# Patient Record
Sex: MALE | NOT HISPANIC OR LATINO | Employment: OTHER | URBAN - METROPOLITAN AREA
[De-identification: names, ages, dates, MRNs, and addresses within clinical notes are randomized per-mention and may not be internally consistent; named-entity substitution may affect disease eponyms.]

---

## 2017-03-13 ENCOUNTER — HOSPITAL ENCOUNTER (OUTPATIENT)
Dept: LAB | Facility: MEDICAL CENTER | Age: 67
End: 2017-03-13
Attending: NEUROLOGICAL SURGERY
Payer: MEDICARE

## 2017-03-13 ENCOUNTER — HOSPITAL ENCOUNTER (OUTPATIENT)
Dept: RADIOLOGY | Facility: MEDICAL CENTER | Age: 67
End: 2017-03-13
Attending: NEUROLOGICAL SURGERY
Payer: MEDICARE

## 2017-03-13 ENCOUNTER — HOSPITAL ENCOUNTER (OUTPATIENT)
Dept: CARDIOLOGY | Facility: MEDICAL CENTER | Age: 67
End: 2017-03-13
Attending: NEUROLOGICAL SURGERY
Payer: MEDICARE

## 2017-03-13 DIAGNOSIS — Z01.812 PRE-OPERATIVE LABORATORY EXAMINATION: ICD-10-CM

## 2017-03-13 DIAGNOSIS — M48.061 SPINAL STENOSIS, LUMBAR REGION, WITHOUT NEUROGENIC CLAUDICATION: ICD-10-CM

## 2017-03-13 LAB
ANION GAP SERPL CALC-SCNC: 4 MMOL/L (ref 0–11.9)
APTT PPP: 30.6 SEC (ref 24.7–36)
BASOPHILS # BLD AUTO: 0.08 K/UL (ref 0–0.12)
BASOPHILS NFR BLD AUTO: 1 % (ref 0–1.8)
BUN SERPL-MCNC: 20 MG/DL (ref 8–22)
CALCIUM SERPL-MCNC: 9.5 MG/DL (ref 8.5–10.5)
CHLORIDE SERPL-SCNC: 105 MMOL/L (ref 96–112)
CO2 SERPL-SCNC: 28 MMOL/L (ref 20–33)
CREAT SERPL-MCNC: 1 MG/DL (ref 0.5–1.4)
EKG IMPRESSION: NORMAL
EOSINOPHIL # BLD: 0.43 K/UL (ref 0–0.51)
EOSINOPHIL NFR BLD AUTO: 5.3 % (ref 0–6.9)
ERYTHROCYTE [DISTWIDTH] IN BLOOD BY AUTOMATED COUNT: 45.1 FL (ref 35.9–50)
GLUCOSE SERPL-MCNC: 90 MG/DL (ref 65–99)
HCT VFR BLD AUTO: 52.3 % (ref 42–52)
HGB BLD-MCNC: 17.2 G/DL (ref 14–18)
IMM GRANULOCYTES # BLD AUTO: 0.04 K/UL (ref 0–0.11)
IMM GRANULOCYTES NFR BLD AUTO: 0.5 % (ref 0–0.9)
INR PPP: 0.96 (ref 0.87–1.13)
LYMPHOCYTES # BLD: 1.5 K/UL (ref 1–4.8)
LYMPHOCYTES NFR BLD AUTO: 18.5 % (ref 22–41)
MCH RBC QN AUTO: 30.7 PG (ref 27–33)
MCHC RBC AUTO-ENTMCNC: 32.9 G/DL (ref 33.7–35.3)
MCV RBC AUTO: 93.2 FL (ref 81.4–97.8)
MONOCYTES # BLD: 0.71 K/UL (ref 0–0.85)
MONOCYTES NFR BLD AUTO: 8.7 % (ref 0–13.4)
NEUTROPHILS # BLD: 5.36 K/UL (ref 1.82–7.42)
NEUTROPHILS NFR BLD AUTO: 66 % (ref 44–72)
NRBC # BLD AUTO: 0 K/UL
NRBC BLD-RTO: 0 /100 WBC
PLATELET # BLD AUTO: 224 K/UL (ref 164–446)
PMV BLD AUTO: 11 FL (ref 9–12.9)
POTASSIUM SERPL-SCNC: 4.4 MMOL/L (ref 3.6–5.5)
PROTHROMBIN TIME: 13.1 SEC (ref 12–14.6)
RBC # BLD AUTO: 5.61 M/UL (ref 4.7–6.1)
SODIUM SERPL-SCNC: 137 MMOL/L (ref 135–145)
WBC # BLD AUTO: 8.1 K/UL (ref 4.8–10.8)

## 2017-03-13 PROCEDURE — 71020 DX-CHEST-2 VIEWS: CPT

## 2017-03-13 PROCEDURE — 93005 ELECTROCARDIOGRAM TRACING: CPT | Performed by: NEUROLOGICAL SURGERY

## 2017-03-13 PROCEDURE — 93010 ELECTROCARDIOGRAM REPORT: CPT | Performed by: INTERNAL MEDICINE

## 2017-03-13 PROCEDURE — 72148 MRI LUMBAR SPINE W/O DYE: CPT

## 2017-04-07 ENCOUNTER — HOSPITAL ENCOUNTER (INPATIENT)
Facility: MEDICAL CENTER | Age: 67
LOS: 3 days | DRG: 460 | End: 2017-04-10
Attending: NEUROLOGICAL SURGERY | Admitting: NEUROLOGICAL SURGERY
Payer: MEDICARE

## 2017-04-07 ENCOUNTER — APPOINTMENT (OUTPATIENT)
Dept: RADIOLOGY | Facility: MEDICAL CENTER | Age: 67
DRG: 460 | End: 2017-04-07
Attending: NEUROLOGICAL SURGERY
Payer: MEDICARE

## 2017-04-07 DIAGNOSIS — M48.061 SPINAL STENOSIS, LUMBAR REGION, WITHOUT NEUROGENIC CLAUDICATION: ICD-10-CM

## 2017-04-07 PROCEDURE — 4A11X4G MONITORING OF PERIPHERAL NERVOUS ELECTRICAL ACTIVITY, INTRAOPERATIVE, EXTERNAL APPROACH: ICD-10-PCS | Performed by: NEUROLOGICAL SURGERY

## 2017-04-07 PROCEDURE — 160048 HCHG OR STATISTICAL LEVEL 1-5: Performed by: NEUROLOGICAL SURGERY

## 2017-04-07 PROCEDURE — 700101 HCHG RX REV CODE 250

## 2017-04-07 PROCEDURE — 700101 HCHG RX REV CODE 250: Performed by: PHYSICIAN ASSISTANT

## 2017-04-07 PROCEDURE — 700111 HCHG RX REV CODE 636 W/ 250 OVERRIDE (IP)

## 2017-04-07 PROCEDURE — 502626 HCHG SURGIFLO HEMOSTATIC MATRIX 6ML: Performed by: NEUROLOGICAL SURGERY

## 2017-04-07 PROCEDURE — 160036 HCHG PACU - EA ADDL 30 MINS PHASE I: Performed by: NEUROLOGICAL SURGERY

## 2017-04-07 PROCEDURE — 500885 HCHG PACK, JACKSON TABLE: Performed by: NEUROLOGICAL SURGERY

## 2017-04-07 PROCEDURE — 500114 HCHG BONE, CHIPS CANCELLOUS 30CC: Performed by: NEUROLOGICAL SURGERY

## 2017-04-07 PROCEDURE — A4606 OXYGEN PROBE USED W OXIMETER: HCPCS | Performed by: NEUROLOGICAL SURGERY

## 2017-04-07 PROCEDURE — 90471 IMMUNIZATION ADMIN: CPT

## 2017-04-07 PROCEDURE — 500819 HCHG MARKERS, PASSIVE REFLECTIVE: Performed by: NEUROLOGICAL SURGERY

## 2017-04-07 PROCEDURE — 502000 HCHG MISC OR IMPLANTS RC 0278: Performed by: NEUROLOGICAL SURGERY

## 2017-04-07 PROCEDURE — 160042 HCHG SURGERY MINUTES - EA ADDL 1 MIN LEVEL 5: Performed by: NEUROLOGICAL SURGERY

## 2017-04-07 PROCEDURE — 700112 HCHG RX REV CODE 229: Performed by: PHYSICIAN ASSISTANT

## 2017-04-07 PROCEDURE — 700102 HCHG RX REV CODE 250 W/ 637 OVERRIDE(OP): Performed by: PHYSICIAN ASSISTANT

## 2017-04-07 PROCEDURE — 770001 HCHG ROOM/CARE - MED/SURG/GYN PRIV*

## 2017-04-07 PROCEDURE — 0SG30AJ FUSION OF LUMBOSACRAL JOINT WITH INTERBODY FUSION DEVICE, POSTERIOR APPROACH, ANTERIOR COLUMN, OPEN APPROACH: ICD-10-PCS | Performed by: NEUROLOGICAL SURGERY

## 2017-04-07 PROCEDURE — A9270 NON-COVERED ITEM OR SERVICE: HCPCS

## 2017-04-07 PROCEDURE — 160035 HCHG PACU - 1ST 60 MINS PHASE I: Performed by: NEUROLOGICAL SURGERY

## 2017-04-07 PROCEDURE — A4314 CATH W/DRAINAGE 2-WAY LATEX: HCPCS | Performed by: NEUROLOGICAL SURGERY

## 2017-04-07 PROCEDURE — 500331 HCHG COTTONOID, SURG PATTIE: Performed by: NEUROLOGICAL SURGERY

## 2017-04-07 PROCEDURE — 501838 HCHG SUTURE GENERAL: Performed by: NEUROLOGICAL SURGERY

## 2017-04-07 PROCEDURE — 0SG10AJ FUSION OF 2 OR MORE LUMBAR VERTEBRAL JOINTS WITH INTERBODY FUSION DEVICE, POSTERIOR APPROACH, ANTERIOR COLUMN, OPEN APPROACH: ICD-10-PCS | Performed by: NEUROLOGICAL SURGERY

## 2017-04-07 PROCEDURE — 110382 HCHG SHELL REV 271: Performed by: NEUROLOGICAL SURGERY

## 2017-04-07 PROCEDURE — 700111 HCHG RX REV CODE 636 W/ 250 OVERRIDE (IP): Performed by: NEUROLOGICAL SURGERY

## 2017-04-07 PROCEDURE — 700101 HCHG RX REV CODE 250: Performed by: NEUROLOGICAL SURGERY

## 2017-04-07 PROCEDURE — 502240 HCHG MISC OR SUPPLY RC 0272: Performed by: NEUROLOGICAL SURGERY

## 2017-04-07 PROCEDURE — 90686 IIV4 VACC NO PRSV 0.5 ML IM: CPT | Performed by: NEUROLOGICAL SURGERY

## 2017-04-07 PROCEDURE — 500864 HCHG NEEDLE, SPINAL 18G: Performed by: NEUROLOGICAL SURGERY

## 2017-04-07 PROCEDURE — 700102 HCHG RX REV CODE 250 W/ 637 OVERRIDE(OP)

## 2017-04-07 PROCEDURE — 0SG10A1 FUSION OF 2 OR MORE LUMBAR VERTEBRAL JOINTS WITH INTERBODY FUSION DEVICE, POSTERIOR APPROACH, POSTERIOR COLUMN, OPEN APPROACH: ICD-10-PCS | Performed by: NEUROLOGICAL SURGERY

## 2017-04-07 PROCEDURE — A9270 NON-COVERED ITEM OR SERVICE: HCPCS | Performed by: PHYSICIAN ASSISTANT

## 2017-04-07 PROCEDURE — 500389 HCHG DRAIN, RESERVOIR SUCT JP 100CC: Performed by: NEUROLOGICAL SURGERY

## 2017-04-07 PROCEDURE — 160009 HCHG ANES TIME/MIN: Performed by: NEUROLOGICAL SURGERY

## 2017-04-07 PROCEDURE — 160002 HCHG RECOVERY MINUTES (STAT): Performed by: NEUROLOGICAL SURGERY

## 2017-04-07 PROCEDURE — A6404 STERILE GAUZE > 48 SQ IN: HCPCS | Performed by: NEUROLOGICAL SURGERY

## 2017-04-07 PROCEDURE — 110371 HCHG SHELL REV 272: Performed by: NEUROLOGICAL SURGERY

## 2017-04-07 PROCEDURE — 72100 X-RAY EXAM L-S SPINE 2/3 VWS: CPT

## 2017-04-07 PROCEDURE — 500375 HCHG DRAIN, J-P ROUND 10FR: Performed by: NEUROLOGICAL SURGERY

## 2017-04-07 PROCEDURE — 160031 HCHG SURGERY MINUTES - 1ST 30 MINS LEVEL 5: Performed by: NEUROLOGICAL SURGERY

## 2017-04-07 DEVICE — BONE CHIPS CANC 4-10MM 30CC - CRUSHED  FREEZE DRIED ONLY: Type: IMPLANTABLE DEVICE | Status: FUNCTIONAL

## 2017-04-07 RX ORDER — AMOXICILLIN 250 MG
1 CAPSULE ORAL NIGHTLY
Status: DISCONTINUED | OUTPATIENT
Start: 2017-04-07 | End: 2017-04-09

## 2017-04-07 RX ORDER — ACETAMINOPHEN 500 MG
TABLET ORAL
Status: DISPENSED
Start: 2017-04-07 | End: 2017-04-07

## 2017-04-07 RX ORDER — CALCIUM CARBONATE 500 MG/1
500 TABLET, CHEWABLE ORAL 2 TIMES DAILY
Status: DISCONTINUED | OUTPATIENT
Start: 2017-04-07 | End: 2017-04-10 | Stop reason: HOSPADM

## 2017-04-07 RX ORDER — ACETAMINOPHEN 500 MG
1000 TABLET ORAL EVERY 6 HOURS PRN
Status: DISCONTINUED | OUTPATIENT
Start: 2017-04-07 | End: 2017-04-09

## 2017-04-07 RX ORDER — ONDANSETRON 2 MG/ML
4 INJECTION INTRAMUSCULAR; INTRAVENOUS EVERY 4 HOURS PRN
Status: DISCONTINUED | OUTPATIENT
Start: 2017-04-07 | End: 2017-04-10 | Stop reason: HOSPADM

## 2017-04-07 RX ORDER — DIAZEPAM 5 MG/ML
5 INJECTION, SOLUTION INTRAMUSCULAR; INTRAVENOUS EVERY 6 HOURS PRN
Status: DISCONTINUED | OUTPATIENT
Start: 2017-04-07 | End: 2017-04-08

## 2017-04-07 RX ORDER — HYDRALAZINE HYDROCHLORIDE 20 MG/ML
10 INJECTION INTRAMUSCULAR; INTRAVENOUS
Status: DISCONTINUED | OUTPATIENT
Start: 2017-04-07 | End: 2017-04-10 | Stop reason: HOSPADM

## 2017-04-07 RX ORDER — HEPARIN SODIUM 5000 [USP'U]/ML
5000 INJECTION, SOLUTION INTRAVENOUS; SUBCUTANEOUS EVERY 12 HOURS
Status: DISCONTINUED | OUTPATIENT
Start: 2017-04-08 | End: 2017-04-10 | Stop reason: HOSPADM

## 2017-04-07 RX ORDER — SODIUM CHLORIDE, SODIUM LACTATE, POTASSIUM CHLORIDE, CALCIUM CHLORIDE 600; 310; 30; 20 MG/100ML; MG/100ML; MG/100ML; MG/100ML
1000 INJECTION, SOLUTION INTRAVENOUS
Status: COMPLETED | OUTPATIENT
Start: 2017-04-07 | End: 2017-04-07

## 2017-04-07 RX ORDER — AMOXICILLIN 250 MG
1 CAPSULE ORAL
Status: DISCONTINUED | OUTPATIENT
Start: 2017-04-07 | End: 2017-04-10 | Stop reason: HOSPADM

## 2017-04-07 RX ORDER — ENEMA 19; 7 G/133ML; G/133ML
1 ENEMA RECTAL
Status: DISCONTINUED | OUTPATIENT
Start: 2017-04-07 | End: 2017-04-10 | Stop reason: HOSPADM

## 2017-04-07 RX ORDER — DIAZEPAM 5 MG/ML
INJECTION, SOLUTION INTRAMUSCULAR; INTRAVENOUS
Status: COMPLETED
Start: 2017-04-07 | End: 2017-04-07

## 2017-04-07 RX ORDER — VANCOMYCIN HCL 900 MCG/MG
POWDER (GRAM) MISCELLANEOUS
Status: DISCONTINUED | OUTPATIENT
Start: 2017-04-07 | End: 2017-04-07 | Stop reason: HOSPADM

## 2017-04-07 RX ORDER — DOCUSATE SODIUM 100 MG/1
100 CAPSULE, LIQUID FILLED ORAL 2 TIMES DAILY
Status: DISCONTINUED | OUTPATIENT
Start: 2017-04-07 | End: 2017-04-10 | Stop reason: HOSPADM

## 2017-04-07 RX ORDER — OXYCODONE HCL 20 MG/1
TABLET, FILM COATED, EXTENDED RELEASE ORAL
Status: DISPENSED
Start: 2017-04-07 | End: 2017-04-07

## 2017-04-07 RX ORDER — DIPHENHYDRAMINE HCL 25 MG
25 TABLET ORAL EVERY 6 HOURS PRN
Status: DISCONTINUED | OUTPATIENT
Start: 2017-04-07 | End: 2017-04-10 | Stop reason: HOSPADM

## 2017-04-07 RX ORDER — POLYETHYLENE GLYCOL 3350 17 G/17G
1 POWDER, FOR SOLUTION ORAL 2 TIMES DAILY PRN
Status: DISCONTINUED | OUTPATIENT
Start: 2017-04-07 | End: 2017-04-10 | Stop reason: HOSPADM

## 2017-04-07 RX ORDER — BUPIVACAINE HYDROCHLORIDE AND EPINEPHRINE 5; 5 MG/ML; UG/ML
INJECTION, SOLUTION EPIDURAL; INTRACAUDAL; PERINEURAL
Status: DISCONTINUED | OUTPATIENT
Start: 2017-04-07 | End: 2017-04-07 | Stop reason: HOSPADM

## 2017-04-07 RX ORDER — ALPRAZOLAM 0.25 MG/1
0.25 TABLET ORAL 2 TIMES DAILY PRN
Status: DISCONTINUED | OUTPATIENT
Start: 2017-04-07 | End: 2017-04-10 | Stop reason: HOSPADM

## 2017-04-07 RX ORDER — MORPHINE SULFATE 15 MG/1
15 TABLET, FILM COATED, EXTENDED RELEASE ORAL
Status: DISCONTINUED | OUTPATIENT
Start: 2017-04-07 | End: 2017-04-10 | Stop reason: HOSPADM

## 2017-04-07 RX ORDER — DIAZEPAM 2 MG/1
5 TABLET ORAL EVERY 8 HOURS PRN
Status: DISCONTINUED | OUTPATIENT
Start: 2017-04-07 | End: 2017-04-08

## 2017-04-07 RX ORDER — BISACODYL 10 MG
10 SUPPOSITORY, RECTAL RECTAL
Status: DISCONTINUED | OUTPATIENT
Start: 2017-04-07 | End: 2017-04-10 | Stop reason: HOSPADM

## 2017-04-07 RX ORDER — ONDANSETRON 4 MG/1
4 TABLET, ORALLY DISINTEGRATING ORAL EVERY 4 HOURS PRN
Status: DISCONTINUED | OUTPATIENT
Start: 2017-04-07 | End: 2017-04-10 | Stop reason: HOSPADM

## 2017-04-07 RX ORDER — LIDOCAINE HYDROCHLORIDE 10 MG/ML
INJECTION, SOLUTION INFILTRATION; PERINEURAL
Status: COMPLETED
Start: 2017-04-07 | End: 2017-04-07

## 2017-04-07 RX ORDER — DIPHENHYDRAMINE HYDROCHLORIDE 50 MG/ML
25 INJECTION INTRAMUSCULAR; INTRAVENOUS EVERY 6 HOURS PRN
Status: DISCONTINUED | OUTPATIENT
Start: 2017-04-07 | End: 2017-04-10 | Stop reason: HOSPADM

## 2017-04-07 RX ORDER — GABAPENTIN 300 MG/1
CAPSULE ORAL
Status: DISPENSED
Start: 2017-04-07 | End: 2017-04-07

## 2017-04-07 RX ORDER — CLINDAMYCIN PHOSPHATE 900 MG/50ML
900 INJECTION, SOLUTION INTRAVENOUS
Status: DISPENSED | OUTPATIENT
Start: 2017-04-07 | End: 2017-04-08

## 2017-04-07 RX ORDER — SODIUM CHLORIDE AND POTASSIUM CHLORIDE 150; 900 MG/100ML; MG/100ML
INJECTION, SOLUTION INTRAVENOUS CONTINUOUS
Status: DISCONTINUED | OUTPATIENT
Start: 2017-04-07 | End: 2017-04-09

## 2017-04-07 RX ORDER — BACITRACIN 50000 [IU]/1
INJECTION, POWDER, FOR SOLUTION INTRAMUSCULAR
Status: DISCONTINUED | OUTPATIENT
Start: 2017-04-07 | End: 2017-04-07 | Stop reason: HOSPADM

## 2017-04-07 RX ADMIN — MORPHINE SULFATE 15 MG: 15 TABLET, EXTENDED RELEASE ORAL at 20:41

## 2017-04-07 RX ADMIN — POTASSIUM CHLORIDE AND SODIUM CHLORIDE: 900; 150 INJECTION, SOLUTION INTRAVENOUS at 18:45

## 2017-04-07 RX ADMIN — FENTANYL CITRATE 50 MCG: 50 INJECTION, SOLUTION INTRAMUSCULAR; INTRAVENOUS at 16:47

## 2017-04-07 RX ADMIN — INFLUENZA A VIRUS A/CALIFORNIA/7/2009 X-179A (H1N1) ANTIGEN (FORMALDEHYDE INACTIVATED), INFLUENZA A VIRUS A/HONG KONG/4801/2014 X-263B (H3N2) ANTIGEN (FORMALDEHYDE INACTIVATED), INFLUENZA B VIRUS B/PHUKET/3073/2013 ANTIGEN (FORMALDEHYDE INACTIVATED), AND INFLUENZA B VIRUS B/BRISBANE/60/2008 ANTIGEN (FORMALDEHYDE INACTIVATED) 0.5 ML: 15; 15; 15; 15 INJECTION, SUSPENSION INTRAMUSCULAR at 20:42

## 2017-04-07 RX ADMIN — VITAMIN D, TAB 1000IU (100/BT) 5000 UNITS: 25 TAB at 18:41

## 2017-04-07 RX ADMIN — FENTANYL CITRATE 50 MCG: 50 INJECTION, SOLUTION INTRAMUSCULAR; INTRAVENOUS at 16:41

## 2017-04-07 RX ADMIN — DIPHENHYDRAMINE HCL 25 MG: 25 TABLET ORAL at 23:45

## 2017-04-07 RX ADMIN — ANTACID TABLETS 500 MG: 500 TABLET, CHEWABLE ORAL at 21:00

## 2017-04-07 RX ADMIN — STANDARDIZED SENNA CONCENTRATE AND DOCUSATE SODIUM 1 TABLET: 8.6; 5 TABLET, FILM COATED ORAL at 20:41

## 2017-04-07 RX ADMIN — MORPHINE SULFATE: 50 INJECTION, SOLUTION, CONCENTRATE INTRAVENOUS at 16:54

## 2017-04-07 RX ADMIN — DIAZEPAM 5 MG: 2 TABLET ORAL at 18:42

## 2017-04-07 RX ADMIN — SODIUM CHLORIDE, SODIUM LACTATE, POTASSIUM CHLORIDE, CALCIUM CHLORIDE 1000 ML: 600; 310; 30; 20 INJECTION, SOLUTION INTRAVENOUS at 09:49

## 2017-04-07 RX ADMIN — DOCUSATE SODIUM 100 MG: 100 CAPSULE ORAL at 20:41

## 2017-04-07 RX ADMIN — LIDOCAINE HYDROCHLORIDE: 10 INJECTION, SOLUTION INFILTRATION; PERINEURAL at 09:49

## 2017-04-07 ASSESSMENT — COPD QUESTIONNAIRES
HAVE YOU SMOKED AT LEAST 100 CIGARETTES IN YOUR ENTIRE LIFE: NO/DON'T KNOW
DO YOU EVER COUGH UP ANY MUCUS OR PHLEGM?: NO/ONLY WITH OCCASIONAL COLDS OR INFECTIONS
COPD SCREENING SCORE: 2
DURING THE PAST 4 WEEKS HOW MUCH DID YOU FEEL SHORT OF BREATH: NONE/LITTLE OF THE TIME

## 2017-04-07 ASSESSMENT — LIFESTYLE VARIABLES
EVER_SMOKED: NEVER
DO YOU DRINK ALCOHOL: NO

## 2017-04-07 ASSESSMENT — PAIN SCALES - GENERAL
PAINLEVEL_OUTOF10: 0
PAINLEVEL_OUTOF10: 4
PAINLEVEL_OUTOF10: 5
PAINLEVEL_OUTOF10: 4
PAINLEVEL_OUTOF10: 0
PAINLEVEL_OUTOF10: 2

## 2017-04-07 NOTE — IP AVS SNAPSHOT
" <p align=\"LEFT\"><IMG SRC=\"//[email or web address]$/[email or web address]\" alt=\"Image\" WIDTH=\"50%\" HEIGHT=\"200\" BORDER=\"\"></p>                   Name:Macario Ocampo  Medical Record Number:[id number]  CSN: [id number]    YOB: 1950   Age: 66 y.o.  Sex: male  HT:1.689 m (5' 6.5\") WT: 62.3 kg (137 lb 5.6 oz)          Admit Date: 4/7/2017     Discharge Date:   Today's Date: 4/10/2017  Attending Doctor:  Ken Booth M.D.                  Allergies:  Pcn; Prednisone; and Shellfish allergy          Follow-up Information     1. Follow up with [facility]. Go on 4/17/2017.    Why:  at 11:00 am       [facility]  [id number]  [place]    Contact information    [place] [id number] [phone]           Medication List      Take these Medications        Instructions    calcium carbonate 500 MG Chew   Commonly known as:  TUMS    Take 1 Tab by mouth 2 Times a Day.   Dose:  500 mg       Cholecalciferol 5000 UNITS Tabs    Take 1,000 Units by mouth every day.   Dose:  1000 Units       CYMBALTA 60 MG Cpep delayed-release capsule   Generic drug:  duloxetine    Take 60 mg by mouth 2 Times a Day.   Dose:  60 mg       gabapentin 300 MG Caps   Commonly known as:  NEURONTIN    Take 300 mg by mouth 2 Times a Day.   Dose:  300 mg       hydrocodone/acetaminophen  MG Tabs   What changed:    - how much to take  - when to take this  - reasons to take this   Commonly known as:  NORCO    Take 1-3 Tabs by mouth every four hours as needed for Moderate Pain or Severe Pain.   Dose:  1-3 Tab       lorazepam 1 MG Tabs   Commonly known as:  ATIVAN    Take 0.5-1 mg by mouth 1 time daily as needed. Indications: Feeling Anxious   Dose:  0.5-1 mg       methocarbamol 750 MG Tabs   Commonly known as:  ROBAXIN    Take 1 Tab by mouth every 8 hours as needed (muscle spasm).   Dose:  750 mg       morphine ER 15 MG Tbcr tablet   Commonly known as:  MS CONTIN    Take 1 Tab by mouth every bedtime.   Dose:  15 mg       propranolol 40 " MG Tabs   Commonly known as:  INDERAL    take 1 tablet by mouth twice daily       senna-docusate 8.6-50 MG Tabs   Commonly known as:  PERICOLACE or SENOKOT S    Take 2 Tabs by mouth 2 times a day.   Dose:  2 Tab

## 2017-04-07 NOTE — IP AVS SNAPSHOT
4/10/2017          Macario Ocampo  [address] [facility] [place] [address]    Dear Macario:    [facility] wants to ensure your discharge home is safe and you or your loved ones have had all your questions answered regarding your care after you leave the hospital.    You may receive a telephone call within two days of your discharge.  This call is to make certain you understand your discharge instructions as well as ensure we provided you with the best care possible during your stay with us.     The call will only last approximately 3-5 minutes and will be done by a nurse.    Once again, we want to ensure your discharge home is safe and that you have a clear understanding of any next steps in your care.  If you have any questions or concerns, please do not hesitate to contact us, we are here for you.  Thank you for choosing [facility] for your healthcare needs.    Sincerely,    Rashad Aj    [facility]

## 2017-04-07 NOTE — IP AVS SNAPSHOT
" Home Care Instructions                                                                                                                  Name:Macario Ocampo  Medical Record Number:[id number]  CSN: [id number]    YOB: 1950   Age: 66 y.o.  Sex: male  HT:1.689 m (5' 6.5\") WT: 62.3 kg (137 lb 5.6 oz)          Admit Date: 4/7/2017     Discharge Date:   Today's Date: 4/10/2017  Attending Doctor:  Ken Booth M.D.                  Allergies:  Pcn; Prednisone; and Shellfish allergy            Discharge Instructions       Continue to wear brace as directed, may remove for showering.  Must have Brace on when out of bed for more than 5 minutes.  No bending, lifting or twisting.   Daily OTC laxative while on narcotics.   No aspirin, NSAID or blood thinners x 2 weeks.   Ambulate often to prevent DVT   Continue incentive spirometer hourly   F/u with [facility] on April 17 at 11:00    Home Health Referral  - will receive call tomorrow morning from Home Health provider    Lumbar Laminectomy, Care After  Refer to this sheet in the next few weeks. These instructions provide you with information on caring for yourself after your procedure. Your health care provider may also give you more specific instructions. Your treatment has been planned according to current medical practices, but problems sometimes occur. Call your health care provider if you have any problems or questions after your procedure.  HOME CARE INSTRUCTIONS   · Check the incision twice a day for signs of infection. Some signs may include a foul-smelling, greenish or yellowish discharge from the wound, increased pain, or increased redness over the incision.  · Change your bandages about 24-36 hours after surgery, or as directed.  · You may shower once the bandage is removed, or as directed. Avoid tub baths, swimming, and hot tubs for 3 weeks or until your incision has healed completely. If you have stitches or staples, they may be removed 2-3 " weeks after surgery, or as directed by your doctor.  · Daily exercise is helpful to prevent the return of problems. Walking is permitted. You may use a treadmill without an incline. Cut down on activities and exercise if you have discomfort. You may also go up and down stairs as much as you can tolerate.  · Do not lift anything heavier than 15 lb. Avoid bending or twisting at the waist. Always bend your knees.  · Maintain strength and range of motion as instructed.  · Do not drive for 2-3 weeks, or as directed by your doctor. You may be a passenger for 20-30 minutes at a time. Lying back in the passenger seat may be more comfortable for you.  · Limit your sitting to intervals of 20-30 minutes. You should lie down or walk in between sitting periods. There are no limitations for sitting in a recliner chair.  · Only take over-the-counter or prescription medicines for pain, discomfort, or fever as directed by your health care provider.  SEEK MEDICAL CARE IF:   · There is increased bleeding (more than a small spot) from the wound.  · You notice redness, swelling, or increasing pain in the wound.  · Pus is coming from the wound.  · You have a fever for more than 2-3 days.  · You notice a foul smell coming from the wound or dressing.  · You have increasing pain in your wound.  SEEK IMMEDIATE MEDICAL CARE IF:   · You develop a rash.  · You have difficulty breathing.  · You have any allergic problems.  · You develop a headache or stiff neck that does not respond to pain relievers.  · You are unable to urinate.  · You develop new onset of pain, numbness, or weakness in the buttocks or lower extremities.  MAKE SURE YOU:   · Understand these instructions.  · Will watch your condition.  · Will get help right away if you are not doing well or get worse.     This information is not intended to replace advice given to you by your health care provider. Make sure you discuss any questions you have with your health care provider.        Document Released: 11/21/2005 Document Revised: 08/20/2014 Document Reviewed: 05/15/2014  [employer] Interactive Patient Education ©2016 [employer] Inc.      Incision Care  An incision is when a surgeon cuts into your body. After surgery, the incision needs to be cared for properly to prevent infection.   HOW TO CARE FOR YOUR INCISION  · Take medicines only as directed by your health care provider.  · There are many different ways to close and cover an incision, including stitches, skin glue, and adhesive strips. Follow your health care provider's instructions on:  · Incision care.  · Bandage (dressing) changes and removal.  · Incision closure removal.  · Do not take baths, swim, or use a hot tub until your health care provider approves. You may shower as directed by your health care provider.  · Resume your normal diet and activities as directed.  · Use anti-itch medicine (such as an antihistamine) as directed by your health care provider. The incision may itch while it is healing. Do not pick or scratch at the incision.  · Drink enough fluid to keep your urine clear or pale yellow.  SEEK MEDICAL CARE IF:   · You have drainage, redness, swelling, or pain at your incision site.  · You have muscle aches, chills, or a general ill feeling.  · You notice a bad smell coming from the incision or dressing.  · Your incision edges separate after the sutures, staples, or skin adhesive strips have been removed.  · You have persistent nausea or vomiting.  · You have a fever.  · You are dizzy.  SEEK IMMEDIATE MEDICAL CARE IF:   · You have a rash.  · You faint.  · You have difficulty breathing.  MAKE SURE YOU:   · Understand these instructions.  · Will watch your condition.  · Will get help right away if you are not doing well or get worse.     This information is not intended to replace advice given to you by your health care provider. Make sure you discuss any questions you have with your health care provider.     Document  Released: 07/07/2006 Document Revised: 01/08/2016 Document Reviewed: 02/11/2015  [employer] Interactive Patient Education ©2016 [employer]      Discharge Instructions    Discharged to home by car with friend. Discharged via walking, hospital escort: Yes.  Special equipment needed: TLSO    Be sure to schedule a follow-up appointment with your primary care doctor or any specialists as instructed.     Discharge Plan:   Influenza Vaccine Given - only chart on this line when given: Influenza Vaccine Given (See MAR)    I understand that a diet low in cholesterol, fat, and sodium is recommended for good health. Unless I have been given specific instructions below for another diet, I accept this instruction as my diet prescription.       Special Instructions: None    · Is patient discharged on Warfarin / Coumadin?   No     · Is patient Post Blood Transfusion?  No    Depression / Suicide Risk    As you are discharged from this [facility] facility, it is important to learn how to keep safe from harming yourself.    Recognize the warning signs:  · Abrupt changes in personality, positive or negative- including increase in energy   · Giving away possessions  · Change in eating patterns- significant weight changes-  positive or negative  · Change in sleeping patterns- unable to sleep or sleeping all the time   · Unwillingness or inability to communicate  · Depression  · Unusual sadness, discouragement and loneliness  · Talk of wanting to die  · Neglect of personal appearance   · Rebelliousness- reckless behavior  · Withdrawal from people/activities they love  · Confusion- inability to concentrate     If you or a loved one observes any of these behaviors or has concerns about self-harm, here's what you can do:  · Talk about it- your feelings and reasons for harming yourself  · Remove any means that you might use to hurt yourself (examples: pills, rope, extension cords, firearm)  · Get professional help from the community  (Mental Health, Substance Abuse, psychological counseling)  · Do not be alone:Call your Safe Contact- someone whom you trust who will be there for you.  · Call your local CRISIS HOTLINE 307-3353 or 938-071-3299  · Call your local Children's Mobile Crisis Response Team [place] (568) 534-6253 or www.[employer]  · Call the toll free National Suicide Prevention Hotlines   · National Suicide Prevention Lifeline 997-341-JWEG (9016)  · [employer] [facility] Line Network 800-SUICIDE (866-1469)            Follow-up Information     1. Follow up with [facility]. Go on 4/17/2017.    Why:  at 11:00 am       [facility]  [address]  [place]    Contact information    [place] [id number]  [phone]           Discharge Medication Instructions:    Below are the medications your physician expects you to take upon discharge:    Review all your home medications and newly ordered medications with your doctor and/or pharmacist. Follow medication instructions as directed by your doctor and/or pharmacist.    Please keep your medication list with you and share with your physician.               Medication List      START taking these medications        Instructions    calcium carbonate 500 MG Chew   Last time this was given:  500 mg on 4/10/2017  9:24 AM   Commonly known as:  TUMS    Take 1 Tab by mouth 2 Times a Day.   Dose:  500 mg       Cholecalciferol 5000 UNITS Tabs   Last time this was given:  5,000 Units on 4/10/2017  9:25 AM    Take 1,000 Units by mouth every day.   Dose:  1000 Units       methocarbamol 750 MG Tabs   Last time this was given:  750 mg on 4/10/2017  4:54 AM   Commonly known as:  ROBAXIN    Take 1 Tab by mouth every 8 hours as needed (muscle spasm).   Dose:  750 mg       morphine ER 15 MG Tbcr tablet   Last time this was given:  15 mg on 4/9/2017  8:19 PM   Commonly known as:  MS CONTIN    Take 1 Tab by mouth every bedtime.   Dose:  15 mg       senna-docusate 8.6-50 MG Tabs   Last  time this was given:  2 Tabs on 4/9/2017  8:20 PM   Commonly known as:  PERICOLACE or SENOKOT S    Take 2 Tabs by mouth 2 times a day.   Dose:  2 Tab         CHANGE how you take these medications        Instructions    hydrocodone/acetaminophen  MG Tabs   What changed:    - how much to take  - when to take this  - reasons to take this   Last time this was given:  2 Tabs on 4/10/2017  9:37 AM   Commonly known as:  NORCO    Take 1-3 Tabs by mouth every four hours as needed for Moderate Pain or Severe Pain.   Dose:  1-3 Tab         CONTINUE taking these medications        Instructions    CYMBALTA 60 MG Cpep delayed-release capsule   Generic drug:  duloxetine    Take 60 mg by mouth 2 Times a Day.   Dose:  60 mg       gabapentin 300 MG Caps   Commonly known as:  NEURONTIN    Take 300 mg by mouth 2 Times a Day.   Dose:  300 mg       lorazepam 1 MG Tabs   Commonly known as:  ATIVAN    Take 0.5-1 mg by mouth 1 time daily as needed. Indications: Feeling Anxious   Dose:  0.5-1 mg       propranolol 40 MG Tabs   Last time this was given:  40 mg on 4/10/2017  9:25 AM   Commonly known as:  INDERAL    take 1 tablet by mouth twice daily         STOP taking these medications     celecoxib 200 MG Caps   Commonly known as:  CELEBREX       LUNESTA 3 MG Tabs   Generic drug:  Eszopiclone               Instructions           Diet / Nutrition:    Follow any diet instructions given to you by your doctor or the dietician, including how much salt (sodium) you are allowed each day.    If you are overweight, talk to your doctor about a weight reduction plan.    Activity:    Remain physically active following your doctor's instructions about exercise and activity.    Rest often.     Any time you become even a little tired or short of breath, SIT DOWN and rest.    Worsening Symptoms:    Report any of the following signs and symptoms to the doctor's office immediately:    *Pain of jaw, arm, or neck  *Chest pain not relieved by medication                                *Dizziness or loss of consciousness  *Difficulty breathing even when at rest   *More tired than usual                                       *Bleeding drainage or swelling of surgical site  *Swelling of feet, ankles, legs or stomach                 *Fever (>100ºF)  *Pink or blood tinged sputum  *Weight gain (3lbs/day or 5lbs /week)           *Shock from internal defibrillator (if applicable)  *Palpitations or irregular heartbeats                *Cool and/or numb extremities    Stroke Awareness    Common Risk Factors for Stroke include:    Age  Atrial Fibrillation  Carotid Artery Stenosis  Diabetes Mellitus  Excessive alcohol consumption  High blood pressure  Overweight   Physical inactivity  Smoking    Warning signs and symptoms of a stroke include:    *Sudden numbness or weakness of the face, arm or leg (especially on one side of the body).  *Sudden confusion, trouble speaking or understanding.  *Sudden trouble seeing in one or both eyes.  *Sudden trouble walking, dizziness, loss of balance or coordination.Sudden severe headache with no known cause.    It is very important to get treatment quickly when a stroke occurs. If you experience any of the above warning signs, call 911 immediately.                   Disclaimer         Quit Smoking / Tobacco Use:    I understand the use of any tobacco products increases my chance of suffering from future heart disease or stroke and could cause other illnesses which may shorten my life. Quitting the use of tobacco products is the single most important thing I can do to improve my health. For further information on smoking / tobacco cessation call a Toll Free Quit Line at 1-202.121.2196 (*National Cancer [place]) or 1-793.838.2561 (American Lung Association) or you can access the web based program at www.lungusa.org.    [place] Tobacco Users Help Line:  (512) 464-5538       Toll Free: 1-690.590.6232  Quit Tobacco Program [facility]  Services (349)188-9467    Crisis Hotline:    National Crisis Hotline:  3-865-TBAYACW or 1-437.757.5657    [place] Crisis Hotline:    1-127.447.9407 or 953-583-5476    Discharge Survey:   Thank you for choosing [facility]. We hope we did everything we could to make your hospital stay a pleasant one. You may be receiving a phone survey and we would appreciate your time and participation in answering the questions. Your input is very valuable to us in our efforts to improve our service to our patients and their families.        My signature on this form indicates that:    1. I have reviewed and understand the above information.  2. My questions regarding this information have been answered to my satisfaction.  3. I have formulated a plan with my discharge nurse to obtain my prescribed medications for home.                  Disclaimer         __________________________________                     __________       ________                       Patient Signature                                                 Date                    Time

## 2017-04-07 NOTE — OP REPORT
OPERATIVE NOTE   Macario Ocampo    4/7/17             PRE-OP DIAGNOSIS: Severe lumbar spondylosis with instability and lumbar radiculopathy            POST-OP DIAGNOSIS: Severe lumbar spondylosis with instability and lumbar radiculopathy            PROCEDURE: Procedure(s) and Anesthesia Type:    1. L3, L4, L5 Laminectomies    2. L3-4, L4-5, L5-S1 Facetectomies/Foraminotomies    3. L3-4, L4-5 Transforaminal Lumbar Interbody Fusions/Graft    4. L5-S1 Posterior Lumbar Interbody Fusion/Grafts    5. L3-S1 Posterior Segmental Instrumentation    6. L3-S1 Posterolateral Spinal Fusion     7. Stereotactic Navigation    8. Fluoroscopy    9. Neuromonitoring (SSEP, MEP and EMG with pedicle screw stimulation)            SURGEON: Surgeon(s) and Role:     Ken Booth M.D. - Primary     BEHZAD Sweet            ANESTHESIA: General Endotracheal            ESTIMATED BLOOD LOSS: 450 ml            DRAINS: 10 [place] KRZYSZTOF            TOTAL IV FLUIDS: 2400 ml            SPECIMENS: None            IMPLANTS: K2M Lujan 5.5 x 45 mm x 2, 5.5 x 40 mm x 2, 6.5 x 35 x 2, 7.5 x 35 mm x 2, [place] Lordotic PLIF cages 6 x 24mm x 6 degrees x2, 9 x 28 mm TLIF (crescent) and 8 x 28 mm TLIF (crescent) cages, 5.5 x 85 mm CoCr Rods x 2, 27 mm Crosslink, Morcelized autograft, crushed cancellous            COMPLICATIONS: None            DISPOSITION: Exutabated to PACU            CONDITION: Stable    Dictation # [id number]

## 2017-04-07 NOTE — IP AVS SNAPSHOT
Cognio Access Code: [email or web address]  Expires: 5/8/2017 11:11 AM    Cognio  A secure, online tool to manage your health information     [employer]’s Cognio® is a secure, online tool that connects you to your personalized health information from the privacy of your home -- day or night - making it very easy for you to manage your healthcare. Once the activation process is completed, you can even access your medical information using the Cognio hollis, which is available for free in the Apple Hollis store or Google Play store.     Cognio provides the following levels of access (as shown below):   My Chart Features   [facility] Primary Care Doctor [facility]  Specialists [facility]  Urgent  Care Non-[facility]  Primary Care  Doctor   Email your healthcare team securely and privately 24/7 X X X X   Manage appointments: schedule your next appointment; view details of past/upcoming appointments X      Request prescription refills. X      View recent personal medical records, including lab and immunizations X X X X   View health record, including health history, allergies, medications X X X X   Read reports about your outpatient visits, procedures, consult and ER notes X X X X   See your discharge summary, which is a recap of your hospital and/or ER visit that includes your diagnosis, lab results, and care plan. X X       How to register for Cognio:  1. Go to  [email or web address].  2. Click on the Sign Up Now box, which takes you to the New Member Sign Up page. You will need to provide the following information:  a. Enter your Cognio Access Code exactly as it appears at the top of this page. (You will not need to use this code after you’ve completed the sign-up process. If you do not sign up before the expiration date, you must request a new code.)   b. Enter your date of birth.   c. Enter your home email address.   d. Click Submit, and follow the next screen’s instructions.  3. Create a Cognio ID. This will be your Cognio  login ID and cannot be changed, so think of one that is secure and easy to remember.  4. Create a [employer] password. You can change your password at any time.  5. Enter your Password Reset Question and Answer. This can be used at a later time if you forget your password.   6. Enter your e-mail address. This allows you to receive e-mail notifications when new information is available in [employer].  7. Click Sign Up. You can now view your health information.    For assistance activating your [employer] account, call [phone]

## 2017-04-08 LAB
ANION GAP SERPL CALC-SCNC: 4 MMOL/L (ref 0–11.9)
BUN SERPL-MCNC: 15 MG/DL (ref 8–22)
CALCIUM SERPL-MCNC: 8.7 MG/DL (ref 8.5–10.5)
CHLORIDE SERPL-SCNC: 107 MMOL/L (ref 96–112)
CO2 SERPL-SCNC: 29 MMOL/L (ref 20–33)
CREAT SERPL-MCNC: 1.05 MG/DL (ref 0.5–1.4)
ERYTHROCYTE [DISTWIDTH] IN BLOOD BY AUTOMATED COUNT: 45.6 FL (ref 35.9–50)
GFR SERPL CREATININE-BSD FRML MDRD: >60 ML/MIN/1.73 M 2
GLUCOSE SERPL-MCNC: 124 MG/DL (ref 65–99)
HCT VFR BLD AUTO: 40 % (ref 42–52)
HGB BLD-MCNC: 13.2 G/DL (ref 14–18)
MCH RBC QN AUTO: 31.2 PG (ref 27–33)
MCHC RBC AUTO-ENTMCNC: 33 G/DL (ref 33.7–35.3)
MCV RBC AUTO: 94.6 FL (ref 81.4–97.8)
PLATELET # BLD AUTO: 191 K/UL (ref 164–446)
PMV BLD AUTO: 10.8 FL (ref 9–12.9)
POTASSIUM SERPL-SCNC: 4.8 MMOL/L (ref 3.6–5.5)
RBC # BLD AUTO: 4.23 M/UL (ref 4.7–6.1)
SODIUM SERPL-SCNC: 140 MMOL/L (ref 135–145)
WBC # BLD AUTO: 20.2 K/UL (ref 4.8–10.8)

## 2017-04-08 PROCEDURE — 97165 OT EVAL LOW COMPLEX 30 MIN: CPT

## 2017-04-08 PROCEDURE — A9270 NON-COVERED ITEM OR SERVICE: HCPCS | Performed by: NURSE PRACTITIONER

## 2017-04-08 PROCEDURE — 700102 HCHG RX REV CODE 250 W/ 637 OVERRIDE(OP): Performed by: NURSE PRACTITIONER

## 2017-04-08 PROCEDURE — G8980 MOBILITY D/C STATUS: HCPCS | Mod: CI

## 2017-04-08 PROCEDURE — 85027 COMPLETE CBC AUTOMATED: CPT

## 2017-04-08 PROCEDURE — G8987 SELF CARE CURRENT STATUS: HCPCS | Mod: CI

## 2017-04-08 PROCEDURE — 80048 BASIC METABOLIC PNL TOTAL CA: CPT

## 2017-04-08 PROCEDURE — 770001 HCHG ROOM/CARE - MED/SURG/GYN PRIV*

## 2017-04-08 PROCEDURE — A9270 NON-COVERED ITEM OR SERVICE: HCPCS | Performed by: PHYSICIAN ASSISTANT

## 2017-04-08 PROCEDURE — 700101 HCHG RX REV CODE 250: Performed by: PHYSICIAN ASSISTANT

## 2017-04-08 PROCEDURE — 700111 HCHG RX REV CODE 636 W/ 250 OVERRIDE (IP): Performed by: PHYSICIAN ASSISTANT

## 2017-04-08 PROCEDURE — 97161 PT EVAL LOW COMPLEX 20 MIN: CPT

## 2017-04-08 PROCEDURE — 700112 HCHG RX REV CODE 229: Performed by: PHYSICIAN ASSISTANT

## 2017-04-08 PROCEDURE — G8979 MOBILITY GOAL STATUS: HCPCS | Mod: CI

## 2017-04-08 PROCEDURE — G8978 MOBILITY CURRENT STATUS: HCPCS | Mod: CI

## 2017-04-08 PROCEDURE — 700102 HCHG RX REV CODE 250 W/ 637 OVERRIDE(OP): Performed by: PHYSICIAN ASSISTANT

## 2017-04-08 PROCEDURE — G8988 SELF CARE GOAL STATUS: HCPCS | Mod: CI

## 2017-04-08 PROCEDURE — 36415 COLL VENOUS BLD VENIPUNCTURE: CPT

## 2017-04-08 RX ORDER — METHOCARBAMOL 750 MG/1
750 TABLET, FILM COATED ORAL EVERY 6 HOURS PRN
Status: DISCONTINUED | OUTPATIENT
Start: 2017-04-08 | End: 2017-04-08

## 2017-04-08 RX ORDER — METHOCARBAMOL 750 MG/1
750 TABLET, FILM COATED ORAL EVERY 8 HOURS
Status: DISCONTINUED | OUTPATIENT
Start: 2017-04-08 | End: 2017-04-10 | Stop reason: HOSPADM

## 2017-04-08 RX ORDER — HYDROCODONE BITARTRATE AND ACETAMINOPHEN 10; 325 MG/1; MG/1
1-2 TABLET ORAL EVERY 4 HOURS PRN
Status: DISCONTINUED | OUTPATIENT
Start: 2017-04-08 | End: 2017-04-09

## 2017-04-08 RX ADMIN — VITAMIN D, TAB 1000IU (100/BT) 5000 UNITS: 25 TAB at 09:23

## 2017-04-08 RX ADMIN — HYDROCODONE BITARTRATE AND ACETAMINOPHEN 2 TABLET: 10; 325 TABLET ORAL at 19:20

## 2017-04-08 RX ADMIN — METHOCARBAMOL 750 MG: 750 TABLET ORAL at 09:23

## 2017-04-08 RX ADMIN — ANTACID TABLETS 500 MG: 500 TABLET, CHEWABLE ORAL at 09:24

## 2017-04-08 RX ADMIN — ANTACID TABLETS 500 MG: 500 TABLET, CHEWABLE ORAL at 20:30

## 2017-04-08 RX ADMIN — HYDROCODONE BITARTRATE AND ACETAMINOPHEN 2 TABLET: 10; 325 TABLET ORAL at 09:24

## 2017-04-08 RX ADMIN — MORPHINE SULFATE 15 MG: 15 TABLET, EXTENDED RELEASE ORAL at 20:30

## 2017-04-08 RX ADMIN — DOCUSATE SODIUM 100 MG: 100 CAPSULE ORAL at 09:23

## 2017-04-08 RX ADMIN — METHOCARBAMOL 750 MG: 750 TABLET ORAL at 14:07

## 2017-04-08 RX ADMIN — HEPARIN SODIUM 5000 UNITS: 5000 INJECTION, SOLUTION INTRAVENOUS; SUBCUTANEOUS at 14:07

## 2017-04-08 RX ADMIN — ALPRAZOLAM 0.25 MG: 0.25 TABLET ORAL at 20:30

## 2017-04-08 RX ADMIN — STANDARDIZED SENNA CONCENTRATE AND DOCUSATE SODIUM 1 TABLET: 8.6; 5 TABLET, FILM COATED ORAL at 20:30

## 2017-04-08 RX ADMIN — HYDROCODONE BITARTRATE AND ACETAMINOPHEN 2 TABLET: 10; 325 TABLET ORAL at 14:06

## 2017-04-08 RX ADMIN — DIPHENHYDRAMINE HCL 25 MG: 25 TABLET ORAL at 20:30

## 2017-04-08 RX ADMIN — METHOCARBAMOL 750 MG: 750 TABLET ORAL at 20:30

## 2017-04-08 RX ADMIN — POTASSIUM CHLORIDE AND SODIUM CHLORIDE: 900; 150 INJECTION, SOLUTION INTRAVENOUS at 06:22

## 2017-04-08 RX ADMIN — DOCUSATE SODIUM 100 MG: 100 CAPSULE ORAL at 20:30

## 2017-04-08 ASSESSMENT — COPD QUESTIONNAIRES
DO YOU EVER COUGH UP ANY MUCUS OR PHLEGM?: NO/ONLY WITH OCCASIONAL COLDS OR INFECTIONS
DURING THE PAST 4 WEEKS HOW MUCH DID YOU FEEL SHORT OF BREATH: NONE/LITTLE OF THE TIME
HAVE YOU SMOKED AT LEAST 100 CIGARETTES IN YOUR ENTIRE LIFE: NO/DON'T KNOW
COPD SCREENING SCORE: 2

## 2017-04-08 ASSESSMENT — GAIT ASSESSMENTS
GAIT LEVEL OF ASSIST: SUPERVISED
DISTANCE (FEET): 250

## 2017-04-08 ASSESSMENT — PAIN SCALES - GENERAL
PAINLEVEL_OUTOF10: 5
PAINLEVEL_OUTOF10: 7

## 2017-04-08 ASSESSMENT — ACTIVITIES OF DAILY LIVING (ADL): TOILETING: INDEPENDENT

## 2017-04-08 NOTE — PROGRESS NOTES
Patient resting quietly in bed, no S/S of distress, AA&Ox4.  Pain is a 6/10, patient medicated per MAR.  Denies SOB, chest pain, dizziness. PCA discontinued per order.  Bed alarm on, call light within reach,  pt calls appropriately and does not get out of bed. Bed in lowest position, bed locked, RN and CNA numbers provided, no further needs at this time. Hourly rounding in place.

## 2017-04-08 NOTE — THERAPY
"Occupational Therapy Evaluation completed.   Functional Status:  Pt s/p lumbar sx, performing ADLs with cga/min a level, requried physical assist and constant vc's for donning/doffing LSO, limited follow through during session. Pt appears to be having difficulties internalizing the education and directions most likely due to medication, was giving pain med ~10-15mins prior to session. Pt will benefit from 1-2 more ADL session to maximize pt's participation with ADLs.  Plan of Care: Will benefit from Occupational Therapy 3 times per week  Discharge Recommendations:  Equipment: No Equipment Needed. Post-acute therapy Discharge to home with outpatient or home health for additional skilled therapy services.    See \"Rehab Therapy-Acute\" Patient Summary Report for complete documentation.    "

## 2017-04-08 NOTE — PROGRESS NOTES
Cook catheter removed with no complications, Patient tolerated well. Patient educated to call for assistance when needing to urinate.

## 2017-04-08 NOTE — THERAPY
"65 y/o male adm for spinal lumbar stenosis without claudication S/P L3-S1 PLIF prior cervical spine sx. Pt presents with supervised bed mobility, transfers and amb with no ad, no motor or sensory deficits noted, no LOB and and no reports of increasd pain. Pt education regarding spinal precausitions and use of LSO. No further acute PT services required at this time.    Physical Therapy Evaluation completed.   Bed Mobility:  Supine to Sit: Supervised  Transfers: Sit to Stand: Supervised  Gait: Level Of Assist: Supervised with No Equipment Needed       Plan of Care: Patient with no further skilled PT needs in the acute care setting at this time  Discharge Recommendations: Equipment: No Equipment Needed. Post-acute therapy Currently anticipate no further skilled therapy needs once patient is discharged from the inpatient setting.    See \"Rehab Therapy-Acute\" Patient Summary Report for complete documentation.     "

## 2017-04-08 NOTE — OR NURSING
Pt woke up restless and agitated, medicated by Anesthesia.  Anesthesia present with ett removed, pt suctioned before and balloon deflated.  Oral in place while sleeping after medication, removed when awakened.  Pt's wife updated.  PCA hooked up and pt instructed on use of PCA.  Dr. Booth by to see pt when first awakening and restless, suggested valium - valium wasted when not needed after pt medicated by Anesthesia.  Denies numbness or tingling, moving all extremities equally, hob up per orders.

## 2017-04-08 NOTE — OP REPORT
DATE OF SERVICE:  04/07/2017    PREOPERATIVE DIAGNOSES:  Severe lumbar spondylosis with instability and lumbar   radiculopathy.    POSTOPERATIVE DIAGNOSES:  Severe lumbar spondylosis with instability and   lumbar radiculopathy.    PROCEDURE PERFORMED:  1.  L3, L4, L5 laminectomies.  2.  L3-L4, L4-L5, L5-S1 facetectomy/foraminotomies.  3.  L3-L4, L4-L5 transforaminal lumbar interbody fusions/graft placement.  4.  L5-S1 posterior lumbar interbody fusion/grafts x2.  5.  L3 through S1 posterior segmental instrumentation.  6.  L3 through S1 posterolateral spinal fusion.  7.  Sterotactic navigation.  8.  Fluoroscopy.    SURGEON:  Ken Booth MD    ASSISTANT:  Cindy Agosto PA-C    ANESTHESIA:  General endotracheal.    ESTIMATED BLOOD LOSS:  450 mL.    DRAIN PLACEMENT:  10-Malay KRZYSZTOF.    TOTAL IV FLUID:  2400 mL of crystalloid.    SPECIMENS:  None.    IMPLANTS:  K2M FLOR 5.5x45 mm screws x2, 5.5x40 mm screws x2, 6.5x35 mm screws   x2, 7.5x35 mm screws x2 and Aleutian lordotic rotating PEEK cages 6x24x6   degrees x2, 9x28 mm Aleutian crescent PEEK cage x1, 8x28 mm crescent PEEK cage   x1, 5.5x85 mm cobalt chromium rods x2, morselized autograft and crushed   cancellous bone.    COMPLICATIONS:  None.    DISPOSITION:  Patient extubated and transported to PACU in stable condition.    INDICATIONS:  The patient is a 66-year-old man who has been experiencing   severe lower back pain along with severe lumbar radiculopathy.  The patient   has undergone multiple nonoperative treatments without resolution of his   underlying discomfort.  Imaging demonstrates the patient to have severe   spondylosis throughout the lumbar spine primarily at the L3-L4, L4-L5, and   L5-S1 levels.  Patient was counseled regarding various treatment options   including continued observation, continued nonoperative pain management, as   well as possible surgical treatment.  Patient was counseled that surgical   treatment include an L3-L4, L4-L5  transforaminal lumbar interbody fusions,   L5-S1 posterior lumbar interbody fusions supplemented with L3 through S1   posterior segmental instrumentation and fusion.  The risks and benefits   associated with the surgical treatment were discussed with the patient in   detail.  The patient has opted to pursue surgical treatment at this time.    DESCRIPTION OF PROCEDURE:  After informed consent had been obtained, the   patient then brought back to the operating room where he was placed in a   supine position.  The patient then placed under general endotracheal   anesthesia and this done without complication.  Patient was then placed in a   prone position on a Ken table with all appropriate pressure points padded.    A midline skin incision was planned extending from the inferior aspect of   the L2 spinous process through the inferior aspect of the S1 spinous process.    The area was then prepped and draped in the usual sterile fashion.  The skin   was then infiltrated with 1% lidocaine, 1:100,000 epinephrine mixed with 0.5%   Marcaine in a 50:50 mixture.  Meanwhile, a timeout was performed to identify   the correct patient, the correct procedure, the correct films with the correct   antibiotic, 2 g of Ancef were noted to be infused.  We then created a skin   incision utilizing #10-blade, carried down to the cutaneous as well as   subcutaneous tissues.  We then continued our dissection to the deep   subcutaneous tissues utilizing Bovie electrocautery and we then carried this   down to the dorsal lumbar ligament.  At this point, we then performed a   subperiosteal dissection along the spinous process of the inferior L2 through   L3, L4, L5 and S1.  Gentle dissection along the spinous process appears in   similar manner laterally, thereby uncovering the lamina.  Care was taken not   to violate the facet joints during this process, which has been performed   bilaterally.  We then placed a stereotactic reference frame  on the L2 spinous   process.  The O-arm was then brought to the operative field.  Images were then   collected.  We then utilized our tech navigation in order to cannulate the   pedicles of L3 through S1 utilizing a cortical trajectory, superior lateral   trajectory.  The anatomical position for cannulating the pedicle was then   noted.  We then created a small starting hole utilizing a 4 mm cutting bur.    We then subsequently utilized a 4 mm drill with a navigated drill guide,   cannulated the pedicle in a superior lateral trajectory.  We then palpated the   ball tip probe finding no breakout.  We then subsequently tapped with a 4.5   mm tap, again palpated the ball tip probe, again we found there to be no   breakout.  We then subsequently placed the sterotactic navigated probe down   the cannulated pedicle, identified the proper trajectory along with the depth.    This was then marked.  We then subsequently placed small amount of Gelfoam   within the cannulated pedicle in order to obtain hemostasis.  This was then   performed at bilateral L3, subsequent bilateral L4, subsequent bilateral L5   and then subsequently bilaterally at S1.  Upon completion, we then performed   the laminectomies.  This was performed utilizing a Leksell rongeur removing   the spinous process and thinning the lamina.  Once the lamina had been   thinned, we then removed the remnant of the thin lamina to an eggshell   thickness utilizing a Midas drill.  Once this had been obtained, we then   performed remaining aspect of the laminectomy utilizing 3 and 4 mm Kerrison   rongeurs, carrying the removal of the lamina laterally to the medial aspect of   the pars interarticularis.  During this process, we also removed a large   amount of ligamentum flavum that had been hypertrophied overlying the thecal   sac at each of the aforementioned levels.  Once this had been completed, we   then utilized the Midas drill in order to make a cut within the  inferior   aspect of the inferior articulating process inferiorly to the cannulated   pars/pedicle.  Once this had been completed, we then removed the inferior   aspect of the inferior articulating process en bloc utilizing a Leksell   rongeur.  We then removed the superior medial aspect of the superior   articulating process utilizing a 3 and 4 mm Kerrison rongeurs, carried this   out laterally, thereby performing a wide facetectomy, wide foraminotomy and a   grade II osteotomy.  This was then performed bilaterally at L3-L4, subsequent   bilateral L4-L5 and subsequent bilateral at L5-S1.  Upon completion, we then   utilized the fluoroscopy in order to cannulate the L5-S1 disc space on the   left utilizing a 6 mm shaver.  Once we had cannulated the disc space with the   shaver, we then utilized a shaver to remove the disc material along with   obtained some distraction of the inferior L5 and superior S1 endplates.  Upon   completion, we then removed the shaver and placed a 7 mm rotating trial within   the disc space.  A trial was then utilized in order to obtain additional   distraction of the inferior L5 and superior S1 endplates.  We then again   cannulated the pedicle on the patient's right side again utilizing a 7 mm   shaver.  Once this has been completed, we then utilized an 8 mm rotating trial   within the disc space in order to obtain additional endplate distraction.  We   then turned our attention to the left side, removing the rotating trial from   within the disc space.  We then prepared a 6x24 mm x 6-degree Aleutian   lordotic rotating PEEK cage with morselized autograft within the interspace of   this PEEK cage.  We then placed the PEEK cage into the disc space utilizing   direct fluoroscopy guidance and rotated this in the appropriate position.  We   then removed the trough from the right side and obtained a second Aleutian   lordotic rotating PEEK cage again measuring 6x24 mm x 6 degrees and placed    morselized autograft within the interspace of this PEEK cage.  We then placed   the PEEK cage within the disc space again utilizing direct fluoroscopic   guidance and rotated into the appropriate position.  Upon completion, we then   repeated the similar process at the L4-L5 level.  However, at this level, we   then selected an 8x28 mm K2M Aleutian crescent PEEK cage.  We placed   morselized autograft again within the interspace of this PEEK cage.  I placed   the PEEK cage within the disc space and broached it from the left side,   rotated it into the appropriate position, again utilizing direct fluoroscopic   guidance.  Upon completion, we then repeated this similar process at the L3-L4   disc space; however, at this disc space, we then placed a 9x28 mm K2M   Aleutian crescent PEEK cage again treated with morselized autograft within the   interspace of the PEEK cage prior to placing the PEEK cage within the disc   space.  Again, we placed the PEEK cage within the disc space, tapped this into   the appropriate position utilizing direct fluoroscopic guidance.  At this   point, we then utilized fluoroscopy in order to place the pedicle screws   within the cannulated pedicles as aforementioned.  We then subsequently placed   5.5x45 mm screws bilaterally at L3, 5.5x40 mm screws bilaterally at L4,   6.5x35 mm screws bilaterally at L5, and 7.5x35 mm screws bilaterally at S1.    We then utilized fluoroscopy in AP view in order to identify the screws were   in appropriate position.  Once confirmed, we then stimulated the screws.  We   found that the stimulation was well above the threshold.  Upon completion, we   then subsequently decorticated the superior articulating process of S1, L5, L4   along with the medial aspect of the L3 transverse process utilizing a thin   Leksell rongeur.  We then placed 5.5x85 mm cobalt chromium rods with   appropriate lordotic curvature within the cords of the pedicle screws   utilizing  appropriate cricket.  Reducing devices were then obtained.    Coaptation of the rods within the cord utilizing partial and subsequently   final locking devices.  Subsequently, we then placed morselized autograft   along with crushed cancellous bone overlying the decorticated superior   articulating processes and medial aspect of the transverse processes   aforementioned in order to obtain a posterolateral spinal fusion mass.  Upon   completion, final images were then obtained with fluoroscopy identifying   appropriate hardware placement and anatomic alignment.  At this point, we then   placed a 27 mm crosslink within the construct.  We then created a stab   incision out to the work incision and tunneled a 10-Turkmen KRZYSZTOF drain into the   working incision.  The drain was then secured in position utilizing 0 Vicryl   suture in a sandal lace configuration.  We then closed the wound using 0   Vicryl sutures in interrupted fashion reapproximating dorsal lumbar fascia and   subsequently the overlying deep subcutaneous tissues in their anatomic   alignment.  The more superficial subcutaneous tissue was reapproximated with   2-0 Vicryl sutures in interrupted fashion after which a 4-0 Monocryl was used   in a running subcuticular manner in order to approximate skin edges.  At the   end of the case, needle count, sponge counts were correct x2.    DISPOSITION:  The patient extubated and transported to the PACU in stable   condition.       ____________________________________     Ken Booth MD    [facility]    DD:  04/07/2017 16:01:00  DT:  04/07/2017 19:38:51    D#:  [id number]  Job#:  [id number]

## 2017-04-08 NOTE — PROGRESS NOTES
Assumed care of pt, pt resting comfortably in bed, PCA rate and dose verified. Call bell with in reach, bed alarm on, will continue to monitor

## 2017-04-09 LAB
ANION GAP SERPL CALC-SCNC: 5 MMOL/L (ref 0–11.9)
BUN SERPL-MCNC: 14 MG/DL (ref 8–22)
CALCIUM SERPL-MCNC: 8.6 MG/DL (ref 8.5–10.5)
CHLORIDE SERPL-SCNC: 105 MMOL/L (ref 96–112)
CO2 SERPL-SCNC: 28 MMOL/L (ref 20–33)
CREAT SERPL-MCNC: 0.87 MG/DL (ref 0.5–1.4)
ERYTHROCYTE [DISTWIDTH] IN BLOOD BY AUTOMATED COUNT: 42.9 FL (ref 35.9–50)
GFR SERPL CREATININE-BSD FRML MDRD: >60 ML/MIN/1.73 M 2
GLUCOSE SERPL-MCNC: 113 MG/DL (ref 65–99)
HCT VFR BLD AUTO: 33 % (ref 42–52)
HGB BLD-MCNC: 11.2 G/DL (ref 14–18)
MCH RBC QN AUTO: 30.9 PG (ref 27–33)
MCHC RBC AUTO-ENTMCNC: 33.9 G/DL (ref 33.7–35.3)
MCV RBC AUTO: 91.2 FL (ref 81.4–97.8)
PLATELET # BLD AUTO: 171 K/UL (ref 164–446)
PMV BLD AUTO: 10.8 FL (ref 9–12.9)
POTASSIUM SERPL-SCNC: 3.4 MMOL/L (ref 3.6–5.5)
RBC # BLD AUTO: 3.62 M/UL (ref 4.7–6.1)
SODIUM SERPL-SCNC: 138 MMOL/L (ref 135–145)
WBC # BLD AUTO: 11.1 K/UL (ref 4.8–10.8)

## 2017-04-09 PROCEDURE — 85027 COMPLETE CBC AUTOMATED: CPT

## 2017-04-09 PROCEDURE — A9270 NON-COVERED ITEM OR SERVICE: HCPCS | Performed by: NEUROLOGICAL SURGERY

## 2017-04-09 PROCEDURE — 700112 HCHG RX REV CODE 229: Performed by: PHYSICIAN ASSISTANT

## 2017-04-09 PROCEDURE — 80048 BASIC METABOLIC PNL TOTAL CA: CPT

## 2017-04-09 PROCEDURE — 700102 HCHG RX REV CODE 250 W/ 637 OVERRIDE(OP): Performed by: NURSE PRACTITIONER

## 2017-04-09 PROCEDURE — A9270 NON-COVERED ITEM OR SERVICE: HCPCS | Performed by: NURSE PRACTITIONER

## 2017-04-09 PROCEDURE — 700101 HCHG RX REV CODE 250: Performed by: PHYSICIAN ASSISTANT

## 2017-04-09 PROCEDURE — A9270 NON-COVERED ITEM OR SERVICE: HCPCS | Performed by: PHYSICIAN ASSISTANT

## 2017-04-09 PROCEDURE — 700111 HCHG RX REV CODE 636 W/ 250 OVERRIDE (IP): Performed by: PHYSICIAN ASSISTANT

## 2017-04-09 PROCEDURE — 700102 HCHG RX REV CODE 250 W/ 637 OVERRIDE(OP): Performed by: PHYSICIAN ASSISTANT

## 2017-04-09 PROCEDURE — 770001 HCHG ROOM/CARE - MED/SURG/GYN PRIV*

## 2017-04-09 PROCEDURE — 700111 HCHG RX REV CODE 636 W/ 250 OVERRIDE (IP): Performed by: NURSE PRACTITIONER

## 2017-04-09 PROCEDURE — 36415 COLL VENOUS BLD VENIPUNCTURE: CPT

## 2017-04-09 PROCEDURE — 700102 HCHG RX REV CODE 250 W/ 637 OVERRIDE(OP): Performed by: NEUROLOGICAL SURGERY

## 2017-04-09 RX ORDER — AMOXICILLIN 250 MG
2 CAPSULE ORAL 2 TIMES DAILY
Status: DISCONTINUED | OUTPATIENT
Start: 2017-04-09 | End: 2017-04-10 | Stop reason: HOSPADM

## 2017-04-09 RX ORDER — HYDROMORPHONE HYDROCHLORIDE 2 MG/1
2 TABLET ORAL
Status: COMPLETED | OUTPATIENT
Start: 2017-04-09 | End: 2017-04-09

## 2017-04-09 RX ORDER — DIAZEPAM 5 MG/ML
5 INJECTION, SOLUTION INTRAMUSCULAR; INTRAVENOUS ONCE
Status: COMPLETED | OUTPATIENT
Start: 2017-04-09 | End: 2017-04-09

## 2017-04-09 RX ORDER — CHLORPROMAZINE HYDROCHLORIDE 25 MG/1
25 TABLET, FILM COATED ORAL 3 TIMES DAILY
Status: DISCONTINUED | OUTPATIENT
Start: 2017-04-09 | End: 2017-04-10 | Stop reason: HOSPADM

## 2017-04-09 RX ORDER — PROPRANOLOL HYDROCHLORIDE 10 MG/1
40 TABLET ORAL TWICE DAILY
Status: DISCONTINUED | OUTPATIENT
Start: 2017-04-10 | End: 2017-04-10 | Stop reason: HOSPADM

## 2017-04-09 RX ORDER — PROPRANOLOL HYDROCHLORIDE 10 MG/1
80 TABLET ORAL ONCE
Status: COMPLETED | OUTPATIENT
Start: 2017-04-09 | End: 2017-04-09

## 2017-04-09 RX ORDER — HYDROCODONE BITARTRATE AND ACETAMINOPHEN 10; 325 MG/1; MG/1
1-3 TABLET ORAL EVERY 4 HOURS PRN
Status: DISCONTINUED | OUTPATIENT
Start: 2017-04-09 | End: 2017-04-10 | Stop reason: HOSPADM

## 2017-04-09 RX ADMIN — ACETAMINOPHEN 1000 MG: 500 TABLET, FILM COATED ORAL at 01:03

## 2017-04-09 RX ADMIN — ANTACID TABLETS 500 MG: 500 TABLET, CHEWABLE ORAL at 07:36

## 2017-04-09 RX ADMIN — DOCUSATE SODIUM 100 MG: 100 CAPSULE ORAL at 20:20

## 2017-04-09 RX ADMIN — ONDANSETRON 4 MG: 2 INJECTION, SOLUTION INTRAMUSCULAR; INTRAVENOUS at 01:53

## 2017-04-09 RX ADMIN — PROPRANOLOL HYDROCHLORIDE 80 MG: 10 TABLET ORAL at 18:39

## 2017-04-09 RX ADMIN — HYDROMORPHONE HYDROCHLORIDE 0.5 MG: 1 INJECTION, SOLUTION INTRAMUSCULAR; INTRAVENOUS; SUBCUTANEOUS at 07:36

## 2017-04-09 RX ADMIN — HYDROCODONE BITARTRATE AND ACETAMINOPHEN 2 TABLET: 10; 325 TABLET ORAL at 18:20

## 2017-04-09 RX ADMIN — HEPARIN SODIUM 5000 UNITS: 5000 INJECTION, SOLUTION INTRAVENOUS; SUBCUTANEOUS at 07:36

## 2017-04-09 RX ADMIN — STANDARDIZED SENNA CONCENTRATE AND DOCUSATE SODIUM 2 TABLET: 8.6; 5 TABLET, FILM COATED ORAL at 20:20

## 2017-04-09 RX ADMIN — CHLORPROMAZINE HYDROCHLORIDE 25 MG: 25 TABLET, SUGAR COATED ORAL at 14:20

## 2017-04-09 RX ADMIN — DOCUSATE SODIUM 100 MG: 100 CAPSULE ORAL at 07:36

## 2017-04-09 RX ADMIN — HYDROCODONE BITARTRATE AND ACETAMINOPHEN 2 TABLET: 10; 325 TABLET ORAL at 01:05

## 2017-04-09 RX ADMIN — METHOCARBAMOL 750 MG: 750 TABLET ORAL at 20:20

## 2017-04-09 RX ADMIN — HYDROMORPHONE HYDROCHLORIDE 2 MG: 2 TABLET ORAL at 06:20

## 2017-04-09 RX ADMIN — HEPARIN SODIUM 5000 UNITS: 5000 INJECTION, SOLUTION INTRAVENOUS; SUBCUTANEOUS at 20:20

## 2017-04-09 RX ADMIN — HYDROCODONE BITARTRATE AND ACETAMINOPHEN 2 TABLET: 10; 325 TABLET ORAL at 09:33

## 2017-04-09 RX ADMIN — STANDARDIZED SENNA CONCENTRATE AND DOCUSATE SODIUM 2 TABLET: 8.6; 5 TABLET, FILM COATED ORAL at 08:56

## 2017-04-09 RX ADMIN — DIAZEPAM 5 MG: 5 INJECTION, SOLUTION INTRAMUSCULAR; INTRAVENOUS at 08:56

## 2017-04-09 RX ADMIN — MORPHINE SULFATE 15 MG: 15 TABLET, EXTENDED RELEASE ORAL at 20:19

## 2017-04-09 RX ADMIN — VITAMIN D, TAB 1000IU (100/BT) 5000 UNITS: 25 TAB at 07:36

## 2017-04-09 RX ADMIN — ANTACID TABLETS 500 MG: 500 TABLET, CHEWABLE ORAL at 20:19

## 2017-04-09 RX ADMIN — POTASSIUM CHLORIDE AND SODIUM CHLORIDE: 900; 150 INJECTION, SOLUTION INTRAVENOUS at 00:15

## 2017-04-09 RX ADMIN — METHOCARBAMOL 750 MG: 750 TABLET ORAL at 14:20

## 2017-04-09 RX ADMIN — HYDROCODONE BITARTRATE AND ACETAMINOPHEN 2 TABLET: 10; 325 TABLET ORAL at 14:24

## 2017-04-09 RX ADMIN — METHOCARBAMOL 750 MG: 750 TABLET ORAL at 05:42

## 2017-04-09 RX ADMIN — CHLORPROMAZINE HYDROCHLORIDE 25 MG: 25 TABLET, SUGAR COATED ORAL at 20:19

## 2017-04-09 RX ADMIN — CHLORPROMAZINE HYDROCHLORIDE 25 MG: 25 TABLET, SUGAR COATED ORAL at 09:33

## 2017-04-09 ASSESSMENT — PAIN SCALES - GENERAL
PAINLEVEL_OUTOF10: 8
PAINLEVEL_OUTOF10: 8

## 2017-04-09 NOTE — PROGRESS NOTES
Progress Note               Author: Vaughn Tuttle Date & Time created: 2017  7:34 AM     Interval History:  POD #2 s/p L3-S1 PSI/PSF  Doing okay. Has increased pain this am. Reached 24 hour limit on tylenol.   Voiding well  Tolerating PO  Passing gas, no abd distention   Has hiccups  Denies radicular pain BLE, claims improved paresthesias from last yesterday.   Walked short distance tid yesterday, sat in chair for 15 min.     Review of Systems:  ROS   Denies new pain, numbness, weakness.   Denies HA, positional HA, N/V, dizziness, chest pain, SOB, difficulty breathing, fevers, chills  Denies unilateral LE pain.       Physical Exam:  Physical Exam   VSS  A&Ox4, NAD  NM: 5/5 hip flexion, knee extension, knee flexion, plantar flexion, dorsiflexion, right EHL. Left EHL 4+/5  Sensation intact and equal throughout all four extremities.   Abdomen: soft, non-tender  Non-labored breathing on room air, normal respiratory effort  Capillary refill in all four extremities <2 seconds  No LE edema, erythema, cyanosis, clubbing  Calves non-tender to compression bilat  Incision CDI, no halo sign  Drain: 160ml/24 hours          Labs:        Invalid input(s): [id number]      Recent Labs      175  17   020   SODIUM  140  138   POTASSIUM  4.8  3.4*   CHLORIDE  107  105   CO2  29  28   BUN  15  14   CREATININE  1.05  0.87   CALCIUM  8.7  8.6     Recent Labs      175  17   020   GLUCOSE  124*  113*     Recent Labs      175  17   020   RBC  4.23*  3.62*   HEMOGLOBIN  13.2*  11.2*   HEMATOCRIT  40.0*  33.0*   PLATELETCT  191  171     Recent Labs      175  17   020   WBC  20.2*  11.1*     Hemodynamics:  Temp (24hrs), Av.1 °C (98.8 °F), Min:36.9 °C (98.4 °F), Max:37.3 °C (99.2 °F)  Temperature: 36.9 °C (98.4 °F)  Pulse  Av.9  Min: 75  Max: 140   Blood Pressure : 112/71 mmHg     Respiratory:    Respiration: 16, Pulse Oximetry: 99 %            Fluids:    Intake/Output Summary (Last 24 hours) at 04/09/17 0734  Last data filed at 04/09/17 0500   Gross per 24 hour   Intake      0 ml   Output    660 ml   Net   -660 ml        GI/Nutrition:  Orders Placed This Encounter   Procedures   • DIET ORDER     Standing Status: Standing      Number of Occurrences: 1      Standing Expiration Date:      Order Specific Question:  Diet:     Answer:  Regular [1]     Order Specific Question:  Diet:     Answer:  Diabetic [3]     Medical Decision Making, by Problem:  Active Hospital Problems    Diagnosis   • Spinal stenosis, lumbar region, without neurogenic claudication [M48.06]       Plan:  POD #2 s/p L3-S1 PSI/PSF  Back pain currently increased. D/C'd PRN tylenol to allow for increased Norco use. Dose of IV dilaudid x 1 now. Continue scheduled Robaxin, QHS MS contin.   Has hiccups. Add thorazine tid  D/C IV fluids  Continue short frequent activity  Increase stool softeners  D/C planning tomorrow    Vaughn Tuttle NP    Core Measures    I agree with the clinical finding along with the assessment and plan as recorded above by Vaughn Tuttle as directed by myself.  I personally examined the patient and clinical data.    Ken Booth MD MSc

## 2017-04-10 ENCOUNTER — HOME HEALTH ADMISSION (OUTPATIENT)
Dept: HOME HEALTH SERVICES | Facility: HOME HEALTHCARE | Age: 67
End: 2017-04-10
Payer: MEDICARE

## 2017-04-10 VITALS
BODY MASS INDEX: 21.56 KG/M2 | RESPIRATION RATE: 16 BRPM | TEMPERATURE: 97.6 F | SYSTOLIC BLOOD PRESSURE: 150 MMHG | HEIGHT: 67 IN | WEIGHT: 137.35 LBS | DIASTOLIC BLOOD PRESSURE: 73 MMHG | OXYGEN SATURATION: 93 % | HEART RATE: 108 BPM

## 2017-04-10 PROCEDURE — A9270 NON-COVERED ITEM OR SERVICE: HCPCS | Performed by: NEUROLOGICAL SURGERY

## 2017-04-10 PROCEDURE — 97530 THERAPEUTIC ACTIVITIES: CPT

## 2017-04-10 PROCEDURE — 700102 HCHG RX REV CODE 250 W/ 637 OVERRIDE(OP): Performed by: NEUROLOGICAL SURGERY

## 2017-04-10 PROCEDURE — 97535 SELF CARE MNGMENT TRAINING: CPT

## 2017-04-10 PROCEDURE — 700112 HCHG RX REV CODE 229: Performed by: PHYSICIAN ASSISTANT

## 2017-04-10 PROCEDURE — 700102 HCHG RX REV CODE 250 W/ 637 OVERRIDE(OP): Performed by: PHYSICIAN ASSISTANT

## 2017-04-10 PROCEDURE — A9270 NON-COVERED ITEM OR SERVICE: HCPCS | Performed by: PHYSICIAN ASSISTANT

## 2017-04-10 PROCEDURE — 700111 HCHG RX REV CODE 636 W/ 250 OVERRIDE (IP): Performed by: PHYSICIAN ASSISTANT

## 2017-04-10 PROCEDURE — A9270 NON-COVERED ITEM OR SERVICE: HCPCS | Performed by: NURSE PRACTITIONER

## 2017-04-10 PROCEDURE — G8988 SELF CARE GOAL STATUS: HCPCS | Mod: CI

## 2017-04-10 PROCEDURE — G8989 SELF CARE D/C STATUS: HCPCS | Mod: CI

## 2017-04-10 PROCEDURE — 700102 HCHG RX REV CODE 250 W/ 637 OVERRIDE(OP): Performed by: NURSE PRACTITIONER

## 2017-04-10 RX ORDER — CALCIUM CARBONATE 500 MG/1
500 TABLET, CHEWABLE ORAL 2 TIMES DAILY
Qty: 60 TAB | Refills: 2 | Status: SHIPPED | OUTPATIENT
Start: 2017-04-10

## 2017-04-10 RX ORDER — METHOCARBAMOL 750 MG/1
750 TABLET, FILM COATED ORAL EVERY 8 HOURS PRN
Qty: 90 TAB | Refills: 2 | Status: SHIPPED | OUTPATIENT
Start: 2017-04-10

## 2017-04-10 RX ORDER — AMOXICILLIN 250 MG
2 CAPSULE ORAL 2 TIMES DAILY
Qty: 90 TAB | Refills: 0 | Status: SHIPPED | OUTPATIENT
Start: 2017-04-10

## 2017-04-10 RX ORDER — HYDROCODONE BITARTRATE AND ACETAMINOPHEN 10; 325 MG/1; MG/1
1-3 TABLET ORAL EVERY 4 HOURS PRN
Qty: 120 TAB | Refills: 0 | Status: SHIPPED | OUTPATIENT
Start: 2017-04-10

## 2017-04-10 RX ORDER — MORPHINE SULFATE 15 MG/1
15 TABLET, FILM COATED, EXTENDED RELEASE ORAL
Qty: 14 TAB | Refills: 0 | Status: SHIPPED | OUTPATIENT
Start: 2017-04-10

## 2017-04-10 RX ADMIN — PROPRANOLOL HYDROCHLORIDE 40 MG: 10 TABLET ORAL at 09:25

## 2017-04-10 RX ADMIN — HYDROCODONE BITARTRATE AND ACETAMINOPHEN 2 TABLET: 10; 325 TABLET ORAL at 03:01

## 2017-04-10 RX ADMIN — ALPRAZOLAM 0.25 MG: 0.25 TABLET ORAL at 03:59

## 2017-04-10 RX ADMIN — CHLORPROMAZINE HYDROCHLORIDE 25 MG: 25 TABLET, SUGAR COATED ORAL at 09:27

## 2017-04-10 RX ADMIN — HYDROCODONE BITARTRATE AND ACETAMINOPHEN 2 TABLET: 10; 325 TABLET ORAL at 09:37

## 2017-04-10 RX ADMIN — ANTACID TABLETS 500 MG: 500 TABLET, CHEWABLE ORAL at 09:24

## 2017-04-10 RX ADMIN — VITAMIN D, TAB 1000IU (100/BT) 5000 UNITS: 25 TAB at 09:25

## 2017-04-10 RX ADMIN — MAGNESIUM CITRATE 148 ML: 1.75 LIQUID ORAL at 09:24

## 2017-04-10 RX ADMIN — DOCUSATE SODIUM 100 MG: 100 CAPSULE ORAL at 09:29

## 2017-04-10 RX ADMIN — HEPARIN SODIUM 5000 UNITS: 5000 INJECTION, SOLUTION INTRAVENOUS; SUBCUTANEOUS at 09:00

## 2017-04-10 RX ADMIN — METHOCARBAMOL 750 MG: 750 TABLET ORAL at 04:54

## 2017-04-10 ASSESSMENT — PAIN SCALES - GENERAL
PAINLEVEL_OUTOF10: 6
PAINLEVEL_OUTOF10: 3

## 2017-04-10 NOTE — DISCHARGE INSTRUCTIONS
Continue to wear brace as directed, may remove for showering.  Must have Brace on when out of bed for more than 5 minutes.  No bending, lifting or twisting.   Daily OTC laxative while on narcotics.   No aspirin, NSAID or blood thinners x 2 weeks.   Ambulate often to prevent DVT   Continue incentive spirometer hourly   F/u with [facility] on April 17 at 11:00    Home Health Referral  - will receive call tomorrow morning from Home Health provider    Lumbar Laminectomy, Care After  Refer to this sheet in the next few weeks. These instructions provide you with information on caring for yourself after your procedure. Your health care provider may also give you more specific instructions. Your treatment has been planned according to current medical practices, but problems sometimes occur. Call your health care provider if you have any problems or questions after your procedure.  HOME CARE INSTRUCTIONS   · Check the incision twice a day for signs of infection. Some signs may include a foul-smelling, greenish or yellowish discharge from the wound, increased pain, or increased redness over the incision.  · Change your bandages about 24-36 hours after surgery, or as directed.  · You may shower once the bandage is removed, or as directed. Avoid tub baths, swimming, and hot tubs for 3 weeks or until your incision has healed completely. If you have stitches or staples, they may be removed 2-3 weeks after surgery, or as directed by your doctor.  · Daily exercise is helpful to prevent the return of problems. Walking is permitted. You may use a treadmill without an incline. Cut down on activities and exercise if you have discomfort. You may also go up and down stairs as much as you can tolerate.  · Do not lift anything heavier than 15 lb. Avoid bending or twisting at the waist. Always bend your knees.  · Maintain strength and range of motion as instructed.  · Do not drive for 2-3 weeks, or as directed by your doctor. You  may be a passenger for 20-30 minutes at a time. Lying back in the passenger seat may be more comfortable for you.  · Limit your sitting to intervals of 20-30 minutes. You should lie down or walk in between sitting periods. There are no limitations for sitting in a recliner chair.  · Only take over-the-counter or prescription medicines for pain, discomfort, or fever as directed by your health care provider.  SEEK MEDICAL CARE IF:   · There is increased bleeding (more than a small spot) from the wound.  · You notice redness, swelling, or increasing pain in the wound.  · Pus is coming from the wound.  · You have a fever for more than 2-3 days.  · You notice a foul smell coming from the wound or dressing.  · You have increasing pain in your wound.  SEEK IMMEDIATE MEDICAL CARE IF:   · You develop a rash.  · You have difficulty breathing.  · You have any allergic problems.  · You develop a headache or stiff neck that does not respond to pain relievers.  · You are unable to urinate.  · You develop new onset of pain, numbness, or weakness in the buttocks or lower extremities.  MAKE SURE YOU:   · Understand these instructions.  · Will watch your condition.  · Will get help right away if you are not doing well or get worse.     This information is not intended to replace advice given to you by your health care provider. Make sure you discuss any questions you have with your health care provider.     Document Released: 11/21/2005 Document Revised: 08/20/2014 Document Reviewed: 05/15/2014  [employer] Interactive Patient Education ©2016 [employer]      Incision Care  An incision is when a surgeon cuts into your body. After surgery, the incision needs to be cared for properly to prevent infection.   HOW TO CARE FOR YOUR INCISION  · Take medicines only as directed by your health care provider.  · There are many different ways to close and cover an incision, including stitches, skin glue, and adhesive strips. Follow your health  care provider's instructions on:  · Incision care.  · Bandage (dressing) changes and removal.  · Incision closure removal.  · Do not take baths, swim, or use a hot tub until your health care provider approves. You may shower as directed by your health care provider.  · Resume your normal diet and activities as directed.  · Use anti-itch medicine (such as an antihistamine) as directed by your health care provider. The incision may itch while it is healing. Do not pick or scratch at the incision.  · Drink enough fluid to keep your urine clear or pale yellow.  SEEK MEDICAL CARE IF:   · You have drainage, redness, swelling, or pain at your incision site.  · You have muscle aches, chills, or a general ill feeling.  · You notice a bad smell coming from the incision or dressing.  · Your incision edges separate after the sutures, staples, or skin adhesive strips have been removed.  · You have persistent nausea or vomiting.  · You have a fever.  · You are dizzy.  SEEK IMMEDIATE MEDICAL CARE IF:   · You have a rash.  · You faint.  · You have difficulty breathing.  MAKE SURE YOU:   · Understand these instructions.  · Will watch your condition.  · Will get help right away if you are not doing well or get worse.     This information is not intended to replace advice given to you by your health care provider. Make sure you discuss any questions you have with your health care provider.     Document Released: 07/07/2006 Document Revised: 01/08/2016 Document Reviewed: 02/11/2015  [employer] Interactive Patient Education ©2016 [employer]      Discharge Instructions    Discharged to home by car with friend. Discharged via walking, hospital escort: Yes.  Special equipment needed: TLSO    Be sure to schedule a follow-up appointment with your primary care doctor or any specialists as instructed.     Discharge Plan:   Influenza Vaccine Given - only chart on this line when given: Influenza Vaccine Given (See MAR)    I understand that a  diet low in cholesterol, fat, and sodium is recommended for good health. Unless I have been given specific instructions below for another diet, I accept this instruction as my diet prescription.       Special Instructions: None    · Is patient discharged on Warfarin / Coumadin?   No     · Is patient Post Blood Transfusion?  No    Depression / Suicide Risk    As you are discharged from this [facility] facility, it is important to learn how to keep safe from harming yourself.    Recognize the warning signs:  · Abrupt changes in personality, positive or negative- including increase in energy   · Giving away possessions  · Change in eating patterns- significant weight changes-  positive or negative  · Change in sleeping patterns- unable to sleep or sleeping all the time   · Unwillingness or inability to communicate  · Depression  · Unusual sadness, discouragement and loneliness  · Talk of wanting to die  · Neglect of personal appearance   · Rebelliousness- reckless behavior  · Withdrawal from people/activities they love  · Confusion- inability to concentrate     If you or a loved one observes any of these behaviors or has concerns about self-harm, here's what you can do:  · Talk about it- your feelings and reasons for harming yourself  · Remove any means that you might use to hurt yourself (examples: pills, rope, extension cords, firearm)  · Get professional help from the community (Mental Health, Substance Abuse, psychological counseling)  · Do not be alone:Call your Safe Contact- someone whom you trust who will be there for you.  · Call your local CRISIS HOTLINE 565-2942 or 990-281-1408  · Call your local Children's Mobile Crisis Response Team [place] (321) 717-7467 or www.Prior Knowledge  · Call the toll free National Suicide Prevention Hotlines   · National Suicide Prevention Lifeline 955-627-TQZT (2748)  · National Hope Line Network 800-SUICIDE (826-8545)

## 2017-04-10 NOTE — PROGRESS NOTES
Pt HR is tachycardic at 112-162, Gilmer Tuttle NP contacted. Pt states his HR feels like it it jumping around in his chest. Pt states he has been on propanolol for 20 plus years and has not had any for a couple days. Talked to Seth in OR, he was giving orders per Dr. Booth. Dr. Booth ordered pt regular dose of propanolol, but to double first dose 1846 -. Pt is stable and states his heart rate feels like normal. Will continue to monitor and report off to oncoming nurse.

## 2017-04-10 NOTE — PROGRESS NOTES
Progress Note               Author: Vaughn Tuttle Date & Time created: 4/10/2017  7:50 AM     Interval History:  POD #3 s/p L3-S1 PSI/PSF  Doing well.   Back pain managed with MS contin at night, robaxin, Norco PRN  Hiccups resolved  Ambulating well   Voiding  Passing flatus  Tolerating PO with good appetite  Drain out  Wants to d/c to home     Review of Systems:  ROS   Denies new pain, numbness, weakness.   Denies HA, positional HA, N/V, dizziness, chest pain, SOB, difficulty breathing, fevers, chills  Denies unilateral LE pain.       Physical Exam:  Physical Exam   VSS  A&Ox4, NAD  NM: 5/5 hip flexion, knee extension, knee flexion, plantar flexion, dorsiflexion, EHL  Sensation intact to light touch LLE  Sensation decreased to light touch Rt anterior thigh, medial calf, similar to baseline  Abdomen: soft, non-tender  Non-labored breathing on room air, normal respiratory effort  Capillary refill in all four extremities <2 seconds  No LE edema, erythema, cyanosis, clubbing  Calves non-tender to compression bilat  Incision: Dermabond in place. No erythema, drainage from incision.   Drain: out        Labs:        Invalid input(s): [id number]      Recent Labs      17   0335  17   0205   SODIUM  140  138   POTASSIUM  4.8  3.4*   CHLORIDE  107  105   CO2  29  28   BUN  15  14   CREATININE  1.05  0.87   CALCIUM  8.7  8.6     Recent Labs      17   0335  17   0205   GLUCOSE  124*  113*     Recent Labs      17   0335  17   0205   RBC  4.23*  3.62*   HEMOGLOBIN  13.2*  11.2*   HEMATOCRIT  40.0*  33.0*   PLATELETCT  191  171     Recent Labs      17   0335  17   0205   WBC  20.2*  11.1*     Hemodynamics:  Temp (24hrs), Av.4 °C (97.5 °F), Min:35.6 °C (96.1 °F), Max:37 °C (98.6 °F)  Temperature: 36.2 °C (97.1 °F)  Pulse  Av.1  Min: 75  Max: 157   Blood Pressure : 105/71 mmHg     Respiratory:    Respiration: 16, Pulse Oximetry: 94 %           Fluids:    Intake/Output  Summary (Last 24 hours) at 04/10/17 0750  Last data filed at 04/09/17 2200   Gross per 24 hour   Intake      0 ml   Output     80 ml   Net    -80 ml        GI/Nutrition:  Orders Placed This Encounter   Procedures   • DIET ORDER     Standing Status: Standing      Number of Occurrences: 1      Standing Expiration Date:      Order Specific Question:  Diet:     Answer:  Regular [1]     Order Specific Question:  Diet:     Answer:  Diabetic [3]     Medical Decision Making, by Problem:  Active Hospital Problems    Diagnosis   • Spinal stenosis, lumbar region, without neurogenic claudication [M48.06]       Plan:  POD #3 s/p L3-S1 PSI/PSF  Plan  D/C planning to home   Continue MS contin, Norco, Robaxin  Add a dose of mag citrate this am  Discussed d/c instructions: No lifting > 10 pounds, no bending, twisting. Frequent ambulation.  Wear brace when OOB. Monitor incision daily, call if any concerns. Follow bowels, needs BM every 1-2 days. Call with any concerns: chest pain, SOB, swelling in extremity, constipation, etc.     Vaughn Tuttle NP    Core Measures    I agree with the clinical finding along with the assessment and plan as recorded above by Vaughn Tuttle as directed by myself.  Patient examined and clinical data reviewed personally.      Ken Booth MD MSc

## 2017-04-10 NOTE — THERAPY
"Occupational Therapy Treatment completed with focus on ADLs, ADL transfers and patient education.  Functional Status:  Up in chair, spv w/sit>stand donning pants and shirt and LSO, did require verbal cues for demonstration of spinal prec during ADL tasks, spv walking in room and in hallway improved posture w/FWW no LOB, stood at sink for grooming w/spv back to chair alarm on call light w/in reach   Plan of Care: Patient with no further skilled OT needs in the acute care setting at this time  Discharge Recommendations:  Equipment No Equipment Needed. Post-acute therapy Discharge to home with outpatient or home health for additional skilled therapy services.    See \"Rehab Therapy-Acute\" Patient Summary Report for complete documentation.   "

## 2017-04-10 NOTE — FACE TO FACE
Face to Face Supporting Documentation - Home Health    The encounter with this patient was in whole or in part the primary reason for home health admission.    Date of encounter:   Patient:                    MRN:                       YOB: 2018  Macario Ocampo  [id number]  1950     Home health to see patient for:  Skilled Nursing care for assessment, interventions & education, Physical Therapy evaluation and treatment, Wound Care and Home health aide    Skilled need for:  Surgical Aftercare Lumbar fusion    Skilled nursing interventions to include:  Wound Care    Homebound status evidenced by:  Needs the assistance of another person in order to leave the home. Leaving home requires a considerable and taxing effort. There is a normal inability to leave the home.    Community Physician to provide follow up care: Miles Rush M.D.     Optional Interventions? No      I certify the face to face encounter for this home health care referral meets the CMS requirements and the encounter/clinical assessment with the patient was, in whole, or in part, for the medical condition(s) listed above, which is the primary reason for home health care. Based on my clinical findings: the service(s) are medically necessary, support the need for home health care, and the homebound criteria are met.  I certify that this patient has had a face to face encounter by myself.  CHRIS Moran.P. - NPI: [id number]

## 2017-04-10 NOTE — PROGRESS NOTES
Provided discharge teaching regarding medication instruction, mobility restriction, incision care, and follow up instructions.  Pt verbalized understanding.  RN assisted pt to car in .  Pt verbalized he did not have money for rx until tomorrow, but does have all of them medications available at home.

## 2017-04-10 NOTE — DISCHARGE PLANNING
Transitional Care Navigator:    TCN met with patient to discuss transitional care services for discharge planning.  Home health has been recommended. Pt familiar with home health level of care from past experience with family.  Choice is [facility] .  Choice form completed and faxed to [facility].  Home health order pending.  RN aware.  TCN will follow-up as needed.

## 2017-04-10 NOTE — PROGRESS NOTES
Pt provided discharge teaching about incision care, mobility restrictions, and medication instructions, including pain medication.  Pt verbalized understanding of instructions.  Pt requested pnuemovax vaccine, RN ordered, but after conference with pharmacist, Wilma, it was found that she does not need a second dose, as she received pneumovax in 2009 according to [facility] records.  RN provided this update to pt, who wants to follow-up with her PMD.    Pt opted to walk out of hospital with , instead of waiting for escort.

## 2017-04-11 ENCOUNTER — HOME CARE VISIT (OUTPATIENT)
Dept: HOME HEALTH SERVICES | Facility: HOME HEALTHCARE | Age: 67
End: 2017-04-11
Payer: MEDICARE

## 2017-04-11 VITALS
WEIGHT: 138 LBS | TEMPERATURE: 97.2 F | HEART RATE: 84 BPM | DIASTOLIC BLOOD PRESSURE: 62 MMHG | RESPIRATION RATE: 18 BRPM | SYSTOLIC BLOOD PRESSURE: 118 MMHG | BODY MASS INDEX: 21.94 KG/M2

## 2017-04-11 PROCEDURE — 665999 HH PPS REVENUE DEBIT

## 2017-04-11 PROCEDURE — G0162 HHC RN E&M PLAN SVS, 15 MIN: HCPCS

## 2017-04-11 PROCEDURE — 665998 HH PPS REVENUE CREDIT

## 2017-04-11 PROCEDURE — 665001 SOC-HOME HEALTH

## 2017-04-11 SDOH — ECONOMIC STABILITY: HOUSING INSECURITY: HOME SAFETY: INSTRUCT PATIENT ON STRATEGIES/MODIFICATIONS TO HOME ENVIRONMENT. REMOVE CAT TOYS TO PREVENT TRIPPING.

## 2017-04-11 SDOH — ECONOMIC STABILITY: HOUSING INSECURITY: UNSAFE COOKING RANGE AREA: 0

## 2017-04-11 SDOH — ECONOMIC STABILITY: HOUSING INSECURITY: UNSAFE APPLIANCES: 0

## 2017-04-11 ASSESSMENT — PATIENT HEALTH QUESTIONNAIRE - PHQ9
2. FEELING DOWN, DEPRESSED, IRRITABLE, OR HOPELESS: 00
1. LITTLE INTEREST OR PLEASURE IN DOING THINGS: 00

## 2017-04-11 ASSESSMENT — ACTIVITIES OF DAILY LIVING (ADL)
HOME_HEALTH_OASIS: 02
OASIS_M1830: 03

## 2017-04-11 ASSESSMENT — ENCOUNTER SYMPTOMS: VOMITING: DENIES ANY

## 2017-04-11 NOTE — DISCHARGE SUMMARY
ADMITTING DIAGNOSES:  Severe lumbar spondylosis with instability and lumbar   radiculopathy.    PROCEDURE PERFORMED:  1.  L3, L4, L5 laminectomies.  2.  L3-L4, L4-L5, L5-S1 facetectomy and foraminotomies.  3.  L3-L4, L4-L5 transforaminal lumbar interbody fusions.  4.  L5-S1 posterior lumbar interbody fusions x2.  5.  L3-S1 posterior segmental instrumentation and spine fusion.    DISCHARGE DIAGNOSES:  Severe lumbar spondylosis with instability and lumbar   radiculopathy, status post aforementioned procedures.    HOSPITAL COURSE:  The patient was admitted for this procedure.  He had been   experiencing severe low back pain along with severe lumbar radiculopathy.    Postoperatively, he transitioned to the postanesthesia care unit and then to   the neurosurgical floor.  His hospital course has been complicated by some   postoperative hiccups.  This was treated well with Thorazine.  Additionally,   he had some rebound tachycardia as he was not getting his routine propranolol.    This was treated with a dose of his propranolol and he has done very well   thereafter.  Overall, he has done very well.  He has continued to progress in   his postoperative recovery.  He is ambulating well with minimal to no   assistance.  He is participating in ADLs with minimal assistance.  His pain is   better with MS Contin 15 mg q HS along with hydrocodone and Robaxin p.r.n.  He is   voiding.  He is passing gas and had a bowel movement.  He gave him a dose of   magnesium citrate this morning.  He is tolerating an oral diet with good   nausea with good appetite.  His KRZYSZTOF drain has been removed.  His incision is   well approximated without signs of complication or erythema.  His vital signs   and laboratory values have been grossly stable.  He meets all criteria to be   appropriate discharge home with home health under the care of his friends.    DISCHARGE INSTRUCTIONS:  The patient is being discharged to home with home   health.  He may  resume regular activity with restrictions.  He should not lift   more than 10 pounds.  He has not been addressed.  He should wear his brace at   all times when upright.  He has Dermabond to reapproximate his wound skin   edges, it is clean and dry.  He should not rub, scrub, or submerge his   incision.  He should call us at any time should he have any questions or   concerns about his incision.  He was given instruction in managing his pain   medications with the current regimen.  He was also instructed to monitor his   bowels, to continue stool softeners and to call should he go more than 1 to 2   days without a bowel movement at any time.  He was also instructed to call   should he have any of the following: Blood, pus or excessive drainage, redness   or separation of his incision edges, increased swelling, uncontrolled pain,   nausea, vomiting, inability to void and/or constipation, fever, shortness of   breath, heart palpitations, extremity swelling and/or discomfort.  He will   follow up with us in approximately 2 weeks, at his scheduled appointment,   sooner if needed.  He was also instructed to call us at any time should he   have any questions or concerns.       ____________________________________     STEVE Starkey / CHIARA    DD:  04/10/2017 08:10:49  DT:  04/10/2017 13:01:43    D#:  [id number]  Job#:  [id number]

## 2017-04-12 ENCOUNTER — HOME CARE VISIT (OUTPATIENT)
Dept: HOME HEALTH SERVICES | Facility: HOME HEALTHCARE | Age: 67
End: 2017-04-12
Payer: MEDICARE

## 2017-04-12 VITALS
HEART RATE: 83 BPM | DIASTOLIC BLOOD PRESSURE: 80 MMHG | SYSTOLIC BLOOD PRESSURE: 104 MMHG | RESPIRATION RATE: 18 BRPM | TEMPERATURE: 97 F

## 2017-04-12 PROCEDURE — 665998 HH PPS REVENUE CREDIT

## 2017-04-12 PROCEDURE — G0156 HHCP-SVS OF AIDE,EA 15 MIN: HCPCS

## 2017-04-12 PROCEDURE — 665999 HH PPS REVENUE DEBIT

## 2017-04-13 ENCOUNTER — HOME CARE VISIT (OUTPATIENT)
Dept: HOME HEALTH SERVICES | Facility: HOME HEALTHCARE | Age: 67
End: 2017-04-13
Payer: MEDICARE

## 2017-04-13 PROCEDURE — 665998 HH PPS REVENUE CREDIT

## 2017-04-13 PROCEDURE — G0299 HHS/HOSPICE OF RN EA 15 MIN: HCPCS

## 2017-04-13 PROCEDURE — G0151 HHCP-SERV OF PT,EA 15 MIN: HCPCS

## 2017-04-13 PROCEDURE — 665999 HH PPS REVENUE DEBIT

## 2017-04-13 ASSESSMENT — ACTIVITIES OF DAILY LIVING (ADL)
TOILETING_ASSISTANCE: 0
ORAL_CARE_ASSISTANCE: 0
EATING_ASSISTANCE: 0
DRESSING_LB_ASSISTANCE: 0
DRESSING_UB_ASSISTANCE: 0

## 2017-04-14 ENCOUNTER — HOME CARE VISIT (OUTPATIENT)
Dept: HOME HEALTH SERVICES | Facility: HOME HEALTHCARE | Age: 67
End: 2017-04-14
Payer: MEDICARE

## 2017-04-14 VITALS
HEART RATE: 84 BPM | TEMPERATURE: 97.7 F | RESPIRATION RATE: 18 BRPM | SYSTOLIC BLOOD PRESSURE: 111 MMHG | DIASTOLIC BLOOD PRESSURE: 70 MMHG

## 2017-04-14 PROCEDURE — 665999 HH PPS REVENUE DEBIT

## 2017-04-14 PROCEDURE — 665998 HH PPS REVENUE CREDIT

## 2017-04-15 PROCEDURE — 665998 HH PPS REVENUE CREDIT

## 2017-04-15 PROCEDURE — 665999 HH PPS REVENUE DEBIT

## 2017-04-16 VITALS
DIASTOLIC BLOOD PRESSURE: 70 MMHG | RESPIRATION RATE: 20 BRPM | HEART RATE: 80 BPM | SYSTOLIC BLOOD PRESSURE: 100 MMHG | TEMPERATURE: 205.2 F

## 2017-04-16 PROCEDURE — 665999 HH PPS REVENUE DEBIT

## 2017-04-16 PROCEDURE — 665998 HH PPS REVENUE CREDIT

## 2017-04-16 SDOH — ECONOMIC STABILITY: HOUSING INSECURITY: UNSAFE APPLIANCES: 0

## 2017-04-16 SDOH — ECONOMIC STABILITY: HOUSING INSECURITY: UNSAFE COOKING RANGE AREA: 0

## 2017-04-16 ASSESSMENT — ENCOUNTER SYMPTOMS
DEBILITATING PAIN: 1
RESPIRATORY SYMPTOMS COMMENTS: NO S/S OF RESP DSITRESS

## 2017-04-17 ENCOUNTER — HOME CARE VISIT (OUTPATIENT)
Dept: HOME HEALTH SERVICES | Facility: HOME HEALTHCARE | Age: 67
End: 2017-04-17
Payer: MEDICARE

## 2017-04-17 ENCOUNTER — HOME CARE VISIT (OUTPATIENT)
Dept: HOME HEALTH SERVICES | Facility: HOME HEALTHCARE | Age: 67
End: 2017-04-17

## 2017-04-17 PROCEDURE — 665999 HH PPS REVENUE DEBIT

## 2017-04-17 PROCEDURE — 665998 HH PPS REVENUE CREDIT

## 2017-04-18 ENCOUNTER — HOME CARE VISIT (OUTPATIENT)
Dept: HOME HEALTH SERVICES | Facility: HOME HEALTHCARE | Age: 67
End: 2017-04-18
Payer: MEDICARE

## 2017-04-18 PROCEDURE — 665998 HH PPS REVENUE CREDIT

## 2017-04-18 PROCEDURE — 665999 HH PPS REVENUE DEBIT

## 2017-04-19 ENCOUNTER — HOME CARE VISIT (OUTPATIENT)
Dept: HOME HEALTH SERVICES | Facility: HOME HEALTHCARE | Age: 67
End: 2017-04-19
Payer: MEDICARE

## 2017-04-19 PROCEDURE — 665999 HH PPS REVENUE DEBIT

## 2017-04-19 PROCEDURE — 665998 HH PPS REVENUE CREDIT

## 2017-04-20 ENCOUNTER — HOME CARE VISIT (OUTPATIENT)
Dept: HOME HEALTH SERVICES | Facility: HOME HEALTHCARE | Age: 67
End: 2017-04-20
Payer: MEDICARE

## 2017-04-20 PROCEDURE — 665997 HH PPS REVENUE ADJ

## 2017-04-20 PROCEDURE — 665999 HH PPS REVENUE DEBIT

## 2017-04-20 PROCEDURE — 665998 HH PPS REVENUE CREDIT

## 2017-04-20 PROCEDURE — G0162 HHC RN E&M PLAN SVS, 15 MIN: HCPCS

## 2017-04-21 PROCEDURE — 665998 HH PPS REVENUE CREDIT

## 2017-04-21 PROCEDURE — 665999 HH PPS REVENUE DEBIT

## 2017-04-22 PROCEDURE — 665999 HH PPS REVENUE DEBIT

## 2017-04-22 PROCEDURE — 665998 HH PPS REVENUE CREDIT

## 2017-04-23 VITALS
TEMPERATURE: 97 F | DIASTOLIC BLOOD PRESSURE: 70 MMHG | RESPIRATION RATE: 20 BRPM | HEART RATE: 81 BPM | SYSTOLIC BLOOD PRESSURE: 110 MMHG

## 2017-04-23 PROCEDURE — 665999 HH PPS REVENUE DEBIT

## 2017-04-23 PROCEDURE — 665998 HH PPS REVENUE CREDIT

## 2017-04-23 SDOH — ECONOMIC STABILITY: HOUSING INSECURITY: UNSAFE APPLIANCES: 0

## 2017-04-23 SDOH — ECONOMIC STABILITY: HOUSING INSECURITY: UNSAFE COOKING RANGE AREA: 0

## 2017-04-23 ASSESSMENT — ACTIVITIES OF DAILY LIVING (ADL)
HOME_HEALTH_OASIS: 00
OASIS_M1830: 00

## 2017-04-23 ASSESSMENT — ENCOUNTER SYMPTOMS: DEBILITATING PAIN: 1

## 2017-04-24 ENCOUNTER — HOME CARE VISIT (OUTPATIENT)
Dept: HOME HEALTH SERVICES | Facility: HOME HEALTHCARE | Age: 67
End: 2017-04-24
Payer: MEDICARE

## 2017-04-24 PROCEDURE — 665999 HH PPS REVENUE DEBIT

## 2017-04-24 PROCEDURE — 665998 HH PPS REVENUE CREDIT

## 2017-04-25 PROCEDURE — 665999 HH PPS REVENUE DEBIT

## 2017-04-25 PROCEDURE — 665998 HH PPS REVENUE CREDIT

## 2017-04-26 PROCEDURE — 665998 HH PPS REVENUE CREDIT

## 2017-04-26 PROCEDURE — 665999 HH PPS REVENUE DEBIT

## 2017-04-27 PROCEDURE — 665998 HH PPS REVENUE CREDIT

## 2017-04-27 PROCEDURE — 665999 HH PPS REVENUE DEBIT

## 2017-04-28 PROCEDURE — 665998 HH PPS REVENUE CREDIT

## 2017-04-28 PROCEDURE — 665999 HH PPS REVENUE DEBIT

## 2017-04-29 PROCEDURE — 665998 HH PPS REVENUE CREDIT

## 2017-04-29 PROCEDURE — 665999 HH PPS REVENUE DEBIT

## 2017-04-30 PROCEDURE — 665999 HH PPS REVENUE DEBIT

## 2017-04-30 PROCEDURE — 665998 HH PPS REVENUE CREDIT

## 2017-05-01 PROCEDURE — 665999 HH PPS REVENUE DEBIT

## 2017-05-01 PROCEDURE — 665998 HH PPS REVENUE CREDIT

## 2017-05-02 PROCEDURE — 665998 HH PPS REVENUE CREDIT

## 2017-05-02 PROCEDURE — 665999 HH PPS REVENUE DEBIT

## 2017-05-03 PROCEDURE — 665999 HH PPS REVENUE DEBIT

## 2017-05-03 PROCEDURE — 665998 HH PPS REVENUE CREDIT

## 2017-05-04 PROCEDURE — 665999 HH PPS REVENUE DEBIT

## 2017-05-04 PROCEDURE — 665998 HH PPS REVENUE CREDIT

## 2017-05-05 PROCEDURE — 665998 HH PPS REVENUE CREDIT

## 2017-05-05 PROCEDURE — 665999 HH PPS REVENUE DEBIT

## 2017-05-06 PROCEDURE — 665999 HH PPS REVENUE DEBIT

## 2017-05-06 PROCEDURE — 665998 HH PPS REVENUE CREDIT

## 2017-05-07 PROCEDURE — 665998 HH PPS REVENUE CREDIT

## 2017-05-07 PROCEDURE — 665999 HH PPS REVENUE DEBIT

## 2017-05-08 ENCOUNTER — HOSPITAL ENCOUNTER (OUTPATIENT)
Dept: RADIOLOGY | Facility: MEDICAL CENTER | Age: 67
End: 2017-05-08
Attending: NEUROLOGICAL SURGERY
Payer: MEDICARE

## 2017-05-08 DIAGNOSIS — R52 PAIN: ICD-10-CM

## 2017-05-08 PROCEDURE — 665999 HH PPS REVENUE DEBIT

## 2017-05-08 PROCEDURE — 665998 HH PPS REVENUE CREDIT

## 2017-05-08 PROCEDURE — 72110 X-RAY EXAM L-2 SPINE 4/>VWS: CPT

## 2017-05-09 PROCEDURE — 665998 HH PPS REVENUE CREDIT

## 2017-05-09 PROCEDURE — 665999 HH PPS REVENUE DEBIT

## 2017-05-10 PROCEDURE — 665998 HH PPS REVENUE CREDIT

## 2017-05-10 PROCEDURE — 665999 HH PPS REVENUE DEBIT

## 2017-05-11 PROCEDURE — 665999 HH PPS REVENUE DEBIT

## 2017-05-11 PROCEDURE — 665998 HH PPS REVENUE CREDIT

## 2017-05-12 PROCEDURE — 665999 HH PPS REVENUE DEBIT

## 2017-05-12 PROCEDURE — 665998 HH PPS REVENUE CREDIT

## 2017-05-13 PROCEDURE — 665998 HH PPS REVENUE CREDIT

## 2017-05-13 PROCEDURE — 665999 HH PPS REVENUE DEBIT

## 2017-05-14 PROCEDURE — 665998 HH PPS REVENUE CREDIT

## 2017-05-14 PROCEDURE — 665999 HH PPS REVENUE DEBIT

## 2017-05-15 PROCEDURE — 665998 HH PPS REVENUE CREDIT

## 2017-05-15 PROCEDURE — 665999 HH PPS REVENUE DEBIT

## 2019-10-15 ENCOUNTER — APPOINTMENT (OUTPATIENT)
Dept: RADIOLOGY | Facility: MEDICAL CENTER | Age: 69
End: 2019-10-15
Payer: MEDICARE

## 2019-10-15 ENCOUNTER — HOSPITAL ENCOUNTER (EMERGENCY)
Facility: MEDICAL CENTER | Age: 69
End: 2019-10-15
Payer: MEDICARE

## 2019-10-15 VITALS
HEART RATE: 124 BPM | DIASTOLIC BLOOD PRESSURE: 72 MMHG | SYSTOLIC BLOOD PRESSURE: 104 MMHG | HEIGHT: 66 IN | OXYGEN SATURATION: 96 % | WEIGHT: 126.32 LBS | BODY MASS INDEX: 20.3 KG/M2 | RESPIRATION RATE: 18 BRPM | TEMPERATURE: 98.6 F

## 2019-10-15 PROCEDURE — 302449 STATCHG TRIAGE ONLY (STATISTIC)

## 2019-10-15 RX ORDER — TAMSULOSIN HYDROCHLORIDE 0.4 MG/1
0.4 CAPSULE ORAL
COMMUNITY

## 2019-10-15 NOTE — ED TRIAGE NOTES
Chief Complaint   Patient presents with   • N/V     pt reports waking today at 0400 with symptoms, pt states to have gone to bed feeling fine. emesis x 5, pt A&Ox4.    • Headache   • Body Aches     Explained to pt triage process, made pt aware to tell this RN/staff of any changes/concerns, pt verbalized understanding of process and instructions given. Pt to ER kathia.

## 2020-02-23 ENCOUNTER — HOSPITAL ENCOUNTER (EMERGENCY)
Dept: HOSPITAL 8 - ED | Age: 70
Discharge: HOME | End: 2020-02-23
Payer: MEDICARE

## 2020-02-23 VITALS — HEIGHT: 66 IN | WEIGHT: 128.09 LBS | BODY MASS INDEX: 20.59 KG/M2

## 2020-02-23 VITALS — SYSTOLIC BLOOD PRESSURE: 107 MMHG | DIASTOLIC BLOOD PRESSURE: 68 MMHG

## 2020-02-23 DIAGNOSIS — R09.81: ICD-10-CM

## 2020-02-23 DIAGNOSIS — R05: ICD-10-CM

## 2020-02-23 DIAGNOSIS — R51: ICD-10-CM

## 2020-02-23 DIAGNOSIS — E86.0: Primary | ICD-10-CM

## 2020-02-23 LAB
ALBUMIN SERPL-MCNC: 4.1 G/DL (ref 3.4–5)
ALP SERPL-CCNC: 94 U/L (ref 45–117)
ALT SERPL-CCNC: 20 U/L (ref 12–78)
ANION GAP SERPL CALC-SCNC: 8 MMOL/L (ref 5–15)
BASOPHILS # BLD AUTO: 0.01 X10^3/UL (ref 0–0.1)
BASOPHILS NFR BLD AUTO: 0 % (ref 0–1)
BILIRUB SERPL-MCNC: 0.8 MG/DL (ref 0.2–1)
CALCIUM SERPL-MCNC: 9.2 MG/DL (ref 8.5–10.1)
CHLORIDE SERPL-SCNC: 104 MMOL/L (ref 98–107)
CREAT SERPL-MCNC: 1.36 MG/DL (ref 0.7–1.3)
CULTURE INDICATED?: NO
EOSINOPHIL # BLD AUTO: 0.07 X10^3/UL (ref 0–0.4)
EOSINOPHIL NFR BLD AUTO: 1 % (ref 1–7)
ERYTHROCYTE [DISTWIDTH] IN BLOOD BY AUTOMATED COUNT: 13.5 % (ref 9.4–14.8)
LYMPHOCYTES # BLD AUTO: 0.64 X10^3/UL (ref 1–3.4)
LYMPHOCYTES NFR BLD AUTO: 7 % (ref 22–44)
MCH RBC QN AUTO: 30.8 PG (ref 27.5–34.5)
MCHC RBC AUTO-ENTMCNC: 33.3 G/DL (ref 33.2–36.2)
MCV RBC AUTO: 92.7 FL (ref 81–97)
MD: NO
MICROSCOPIC: (no result)
MONOCYTES # BLD AUTO: 0.59 X10^3/UL (ref 0.2–0.8)
MONOCYTES NFR BLD AUTO: 6 % (ref 2–9)
NEUTROPHILS # BLD AUTO: 8.2 X10^3/UL (ref 1.8–6.8)
NEUTROPHILS NFR BLD AUTO: 86 % (ref 42–75)
PLATELET # BLD AUTO: 210 X10^3/UL (ref 130–400)
PMV BLD AUTO: 9.2 FL (ref 7.4–10.4)
PROT SERPL-MCNC: 8.8 G/DL (ref 6.4–8.2)
RBC # BLD AUTO: 5.56 X10^6/UL (ref 4.38–5.82)

## 2020-02-23 PROCEDURE — 99284 EMERGENCY DEPT VISIT MOD MDM: CPT

## 2020-02-23 PROCEDURE — 87040 BLOOD CULTURE FOR BACTERIA: CPT

## 2020-02-23 PROCEDURE — 87400 INFLUENZA A/B EACH AG IA: CPT

## 2020-02-23 PROCEDURE — 84145 PROCALCITONIN (PCT): CPT

## 2020-02-23 PROCEDURE — 96375 TX/PRO/DX INJ NEW DRUG ADDON: CPT

## 2020-02-23 PROCEDURE — 71045 X-RAY EXAM CHEST 1 VIEW: CPT

## 2020-02-23 PROCEDURE — 83605 ASSAY OF LACTIC ACID: CPT

## 2020-02-23 PROCEDURE — 96374 THER/PROPH/DIAG INJ IV PUSH: CPT

## 2020-02-23 PROCEDURE — 96361 HYDRATE IV INFUSION ADD-ON: CPT

## 2020-02-23 PROCEDURE — 80053 COMPREHEN METABOLIC PANEL: CPT

## 2020-02-23 PROCEDURE — 81003 URINALYSIS AUTO W/O SCOPE: CPT

## 2020-02-23 PROCEDURE — 36415 COLL VENOUS BLD VENIPUNCTURE: CPT

## 2020-02-23 PROCEDURE — 85025 COMPLETE CBC W/AUTO DIFF WBC: CPT

## 2020-03-22 ENCOUNTER — HOSPITAL ENCOUNTER (INPATIENT)
Facility: MEDICAL CENTER | Age: 70
LOS: 2 days | DRG: 965 | End: 2020-03-24
Attending: EMERGENCY MEDICINE | Admitting: SURGERY
Payer: MEDICARE

## 2020-03-22 ENCOUNTER — APPOINTMENT (OUTPATIENT)
Dept: RADIOLOGY | Facility: MEDICAL CENTER | Age: 70
DRG: 965 | End: 2020-03-22
Payer: MEDICARE

## 2020-03-22 ENCOUNTER — APPOINTMENT (OUTPATIENT)
Dept: RADIOLOGY | Facility: MEDICAL CENTER | Age: 70
DRG: 965 | End: 2020-03-22
Attending: EMERGENCY MEDICINE
Payer: MEDICARE

## 2020-03-22 DIAGNOSIS — S71.031A: ICD-10-CM
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
## 2020-03-22 PROBLEM — S72.111B: Status: ACTIVE | Noted: 2020-03-22

## 2020-03-22 PROBLEM — T14.90XA TRAUMA: Status: ACTIVE | Noted: 2020-03-22

## 2020-03-22 LAB
ABO + RH BLD: NORMAL
ABO GROUP BLD: NORMAL
ALBUMIN SERPL BCP-MCNC: 4.1 G/DL (ref 3.2–4.9)
ALBUMIN/GLOB SERPL: 1.2 G/DL
ALP SERPL-CCNC: 76 U/L (ref 30–99)
ALT SERPL-CCNC: 18 U/L (ref 2–50)
ANION GAP SERPL CALC-SCNC: 11 MMOL/L (ref 7–16)
APTT PPP: 27.4 SEC (ref 24.7–36)
AST SERPL-CCNC: 15 U/L (ref 12–45)
BILIRUB SERPL-MCNC: 0.7 MG/DL (ref 0.1–1.5)
BLD GP AB SCN SERPL QL: NORMAL
BUN SERPL-MCNC: 13 MG/DL (ref 8–22)
CALCIUM SERPL-MCNC: 9.1 MG/DL (ref 8.5–10.5)
CHLORIDE SERPL-SCNC: 104 MMOL/L (ref 96–112)
CO2 SERPL-SCNC: 21 MMOL/L (ref 20–33)
CREAT SERPL-MCNC: 0.93 MG/DL (ref 0.5–1.4)
ERYTHROCYTE [DISTWIDTH] IN BLOOD BY AUTOMATED COUNT: 44.6 FL (ref 35.9–50)
ETHANOL BLD-MCNC: <10.1 MG/DL (ref 0–10.1)
GLOBULIN SER CALC-MCNC: 3.3 G/DL (ref 1.9–3.5)
GLUCOSE SERPL-MCNC: 128 MG/DL (ref 65–99)
HCT VFR BLD AUTO: 49.3 % (ref 42–52)
HGB BLD-MCNC: 16.2 G/DL (ref 14–18)
INR PPP: 1.04 (ref 0.87–1.13)
MCH RBC QN AUTO: 30.8 PG (ref 27–33)
MCHC RBC AUTO-ENTMCNC: 32.9 G/DL (ref 33.7–35.3)
MCV RBC AUTO: 93.7 FL (ref 81.4–97.8)
PLATELET # BLD AUTO: 221 K/UL (ref 164–446)
PMV BLD AUTO: 11.2 FL (ref 9–12.9)
POTASSIUM SERPL-SCNC: 4.1 MMOL/L (ref 3.6–5.5)
PROT SERPL-MCNC: 7.4 G/DL (ref 6–8.2)
PROTHROMBIN TIME: 13.8 SEC (ref 12–14.6)
RBC # BLD AUTO: 5.26 M/UL (ref 4.7–6.1)
RH BLD: NORMAL
SODIUM SERPL-SCNC: 136 MMOL/L (ref 135–145)
WBC # BLD AUTO: 8.3 K/UL (ref 4.8–10.8)

## 2020-03-22 PROCEDURE — 770006 HCHG ROOM/CARE - MED/SURG/GYN SEMI*

## 2020-03-22 PROCEDURE — 99285 EMERGENCY DEPT VISIT HI MDM: CPT

## 2020-03-22 PROCEDURE — 305949 HCHG RED TRAUMA ACT PRE-NOTIFY NO CC

## 2020-03-22 PROCEDURE — 86901 BLOOD TYPING SEROLOGIC RH(D): CPT

## 2020-03-22 PROCEDURE — 74174 CTA ABD&PLVS W/CONTRAST: CPT

## 2020-03-22 PROCEDURE — 71045 X-RAY EXAM CHEST 1 VIEW: CPT

## 2020-03-22 PROCEDURE — 90471 IMMUNIZATION ADMIN: CPT

## 2020-03-22 PROCEDURE — 700102 HCHG RX REV CODE 250 W/ 637 OVERRIDE(OP): Performed by: SURGERY

## 2020-03-22 PROCEDURE — A9270 NON-COVERED ITEM OR SERVICE: HCPCS | Performed by: SURGERY

## 2020-03-22 PROCEDURE — 700105 HCHG RX REV CODE 258: Performed by: SURGERY

## 2020-03-22 PROCEDURE — 80307 DRUG TEST PRSMV CHEM ANLYZR: CPT

## 2020-03-22 PROCEDURE — 85610 PROTHROMBIN TIME: CPT

## 2020-03-22 PROCEDURE — 700105 HCHG RX REV CODE 258

## 2020-03-22 PROCEDURE — 72170 X-RAY EXAM OF PELVIS: CPT

## 2020-03-22 PROCEDURE — 86900 BLOOD TYPING SEROLOGIC ABO: CPT

## 2020-03-22 PROCEDURE — 700105 HCHG RX REV CODE 258: Performed by: EMERGENCY MEDICINE

## 2020-03-22 PROCEDURE — 80053 COMPREHEN METABOLIC PANEL: CPT

## 2020-03-22 PROCEDURE — 700111 HCHG RX REV CODE 636 W/ 250 OVERRIDE (IP): Performed by: SURGERY

## 2020-03-22 PROCEDURE — 86850 RBC ANTIBODY SCREEN: CPT

## 2020-03-22 PROCEDURE — 700112 HCHG RX REV CODE 229: Performed by: SURGERY

## 2020-03-22 PROCEDURE — 96365 THER/PROPH/DIAG IV INF INIT: CPT

## 2020-03-22 PROCEDURE — 85730 THROMBOPLASTIN TIME PARTIAL: CPT

## 2020-03-22 PROCEDURE — 700117 HCHG RX CONTRAST REV CODE 255: Performed by: EMERGENCY MEDICINE

## 2020-03-22 PROCEDURE — 74018 RADEX ABDOMEN 1 VIEW: CPT

## 2020-03-22 PROCEDURE — 90715 TDAP VACCINE 7 YRS/> IM: CPT | Performed by: EMERGENCY MEDICINE

## 2020-03-22 PROCEDURE — 85027 COMPLETE CBC AUTOMATED: CPT

## 2020-03-22 PROCEDURE — 36415 COLL VENOUS BLD VENIPUNCTURE: CPT

## 2020-03-22 PROCEDURE — 700111 HCHG RX REV CODE 636 W/ 250 OVERRIDE (IP): Performed by: EMERGENCY MEDICINE

## 2020-03-22 PROCEDURE — 73552 X-RAY EXAM OF FEMUR 2/>: CPT | Mod: RT

## 2020-03-22 RX ORDER — AMOXICILLIN 250 MG
1 CAPSULE ORAL
Status: DISCONTINUED | OUTPATIENT
Start: 2020-03-22 | End: 2020-03-24 | Stop reason: HOSPADM

## 2020-03-22 RX ORDER — DULOXETIN HYDROCHLORIDE 60 MG/1
60 CAPSULE, DELAYED RELEASE ORAL DAILY
Status: DISCONTINUED | OUTPATIENT
Start: 2020-03-22 | End: 2020-03-24 | Stop reason: HOSPADM

## 2020-03-22 RX ORDER — MORPHINE SULFATE 4 MG/ML
2 INJECTION, SOLUTION INTRAMUSCULAR; INTRAVENOUS
Status: DISCONTINUED | OUTPATIENT
Start: 2020-03-22 | End: 2020-03-24 | Stop reason: HOSPADM

## 2020-03-22 RX ORDER — SODIUM CHLORIDE 9 MG/ML
INJECTION, SOLUTION INTRAVENOUS
Status: COMPLETED
Start: 2020-03-22 | End: 2020-03-22

## 2020-03-22 RX ORDER — ACETAMINOPHEN 650 MG/1
650 SUPPOSITORY RECTAL EVERY 4 HOURS PRN
Status: DISCONTINUED | OUTPATIENT
Start: 2020-03-22 | End: 2020-03-24 | Stop reason: HOSPADM

## 2020-03-22 RX ORDER — POLYETHYLENE GLYCOL 3350 17 G/17G
1 POWDER, FOR SOLUTION ORAL 2 TIMES DAILY
Status: DISCONTINUED | OUTPATIENT
Start: 2020-03-22 | End: 2020-03-24 | Stop reason: HOSPADM

## 2020-03-22 RX ORDER — PROPRANOLOL HYDROCHLORIDE 40 MG/1
40 TABLET ORAL 2 TIMES DAILY
COMMUNITY

## 2020-03-22 RX ORDER — PROPRANOLOL HYDROCHLORIDE 10 MG/1
40 TABLET ORAL TWICE DAILY
Status: DISCONTINUED | OUTPATIENT
Start: 2020-03-22 | End: 2020-03-24 | Stop reason: HOSPADM

## 2020-03-22 RX ORDER — OXYCODONE HYDROCHLORIDE 5 MG/1
5 TABLET ORAL
Status: DISCONTINUED | OUTPATIENT
Start: 2020-03-22 | End: 2020-03-24 | Stop reason: HOSPADM

## 2020-03-22 RX ORDER — CEFAZOLIN SODIUM 2 G/100ML
INJECTION, SOLUTION INTRAVENOUS
Status: COMPLETED | OUTPATIENT
Start: 2020-03-22 | End: 2020-03-22

## 2020-03-22 RX ORDER — AMOXICILLIN 250 MG
1 CAPSULE ORAL NIGHTLY
Status: DISCONTINUED | OUTPATIENT
Start: 2020-03-22 | End: 2020-03-24 | Stop reason: HOSPADM

## 2020-03-22 RX ORDER — ACETAMINOPHEN 325 MG/1
650 TABLET ORAL EVERY 4 HOURS PRN
Status: DISCONTINUED | OUTPATIENT
Start: 2020-03-22 | End: 2020-03-24 | Stop reason: HOSPADM

## 2020-03-22 RX ORDER — ACETAMINOPHEN 325 MG/1
650 TABLET ORAL EVERY 6 HOURS
Status: DISCONTINUED | OUTPATIENT
Start: 2020-03-22 | End: 2020-03-24 | Stop reason: HOSPADM

## 2020-03-22 RX ORDER — DOCUSATE SODIUM 100 MG/1
100 CAPSULE, LIQUID FILLED ORAL 2 TIMES DAILY
Status: DISCONTINUED | OUTPATIENT
Start: 2020-03-22 | End: 2020-03-24 | Stop reason: HOSPADM

## 2020-03-22 RX ORDER — OXYCODONE HYDROCHLORIDE 5 MG/1
2.5 TABLET ORAL
Status: DISCONTINUED | OUTPATIENT
Start: 2020-03-22 | End: 2020-03-24 | Stop reason: HOSPADM

## 2020-03-22 RX ORDER — SODIUM CHLORIDE 9 MG/ML
INJECTION, SOLUTION INTRAVENOUS
Status: COMPLETED | OUTPATIENT
Start: 2020-03-22 | End: 2020-03-22

## 2020-03-22 RX ORDER — DULOXETIN HYDROCHLORIDE 60 MG/1
60 CAPSULE, DELAYED RELEASE ORAL DAILY
COMMUNITY

## 2020-03-22 RX ORDER — ENEMA 19; 7 G/133ML; G/133ML
1 ENEMA RECTAL
Status: DISCONTINUED | OUTPATIENT
Start: 2020-03-22 | End: 2020-03-24 | Stop reason: HOSPADM

## 2020-03-22 RX ORDER — LORAZEPAM 1 MG/1
1 TABLET ORAL NIGHTLY PRN
COMMUNITY

## 2020-03-22 RX ORDER — TAMSULOSIN HYDROCHLORIDE 0.4 MG/1
0.4 CAPSULE ORAL DAILY
COMMUNITY

## 2020-03-22 RX ORDER — TAMSULOSIN HYDROCHLORIDE 0.4 MG/1
0.4 CAPSULE ORAL DAILY
Status: DISCONTINUED | OUTPATIENT
Start: 2020-03-22 | End: 2020-03-24 | Stop reason: HOSPADM

## 2020-03-22 RX ORDER — LORAZEPAM 1 MG/1
1 TABLET ORAL EVERY 6 HOURS PRN
Status: DISCONTINUED | OUTPATIENT
Start: 2020-03-22 | End: 2020-03-24 | Stop reason: HOSPADM

## 2020-03-22 RX ORDER — SODIUM CHLORIDE, SODIUM LACTATE, POTASSIUM CHLORIDE, CALCIUM CHLORIDE 600; 310; 30; 20 MG/100ML; MG/100ML; MG/100ML; MG/100ML
INJECTION, SOLUTION INTRAVENOUS CONTINUOUS
Status: DISCONTINUED | OUTPATIENT
Start: 2020-03-22 | End: 2020-03-23

## 2020-03-22 RX ORDER — CEFAZOLIN SODIUM 2 G/100ML
2 INJECTION, SOLUTION INTRAVENOUS EVERY 8 HOURS
Status: COMPLETED | OUTPATIENT
Start: 2020-03-22 | End: 2020-03-23

## 2020-03-22 RX ORDER — BISACODYL 10 MG
10 SUPPOSITORY, RECTAL RECTAL
Status: DISCONTINUED | OUTPATIENT
Start: 2020-03-22 | End: 2020-03-24 | Stop reason: HOSPADM

## 2020-03-22 RX ADMIN — CEFAZOLIN SODIUM 2 G: 2 INJECTION, SOLUTION INTRAVENOUS at 20:28

## 2020-03-22 RX ADMIN — OXYCODONE HYDROCHLORIDE 5 MG: 5 TABLET ORAL at 16:06

## 2020-03-22 RX ADMIN — SODIUM CHLORIDE, POTASSIUM CHLORIDE, SODIUM LACTATE AND CALCIUM CHLORIDE: 600; 310; 30; 20 INJECTION, SOLUTION INTRAVENOUS at 16:06

## 2020-03-22 RX ADMIN — SODIUM CHLORIDE 500 ML: 9 INJECTION, SOLUTION INTRAVENOUS at 20:27

## 2020-03-22 RX ADMIN — CLOSTRIDIUM TETANI TOXOID ANTIGEN (FORMALDEHYDE INACTIVATED), CORYNEBACTERIUM DIPHTHERIAE TOXOID ANTIGEN (FORMALDEHYDE INACTIVATED), BORDETELLA PERTUSSIS TOXOID ANTIGEN (GLUTARALDEHYDE INACTIVATED), BORDETELLA PERTUSSIS FILAMENTOUS HEMAGGLUTININ ANTIGEN (FORMALDEHYDE INACTIVATED), BORDETELLA PERTUSSIS PERTACTIN ANTIGEN, AND BORDETELLA PERTUSSIS FIMBRIAE 2/3 ANTIGEN 0.5 ML: 5; 2; 2.5; 5; 3; 5 INJECTION, SUSPENSION INTRAMUSCULAR at 13:52

## 2020-03-22 RX ADMIN — DOCUSATE SODIUM 100 MG: 100 CAPSULE, LIQUID FILLED ORAL at 18:38

## 2020-03-22 RX ADMIN — SENNOSIDES AND DOCUSATE SODIUM 1 TABLET: 8.6; 5 TABLET ORAL at 20:27

## 2020-03-22 RX ADMIN — ACETAMINOPHEN 650 MG: 325 TABLET, FILM COATED ORAL at 18:38

## 2020-03-22 RX ADMIN — OXYCODONE HYDROCHLORIDE 5 MG: 5 TABLET ORAL at 20:27

## 2020-03-22 RX ADMIN — CEFAZOLIN SODIUM 2 G: 2 INJECTION, SOLUTION INTRAVENOUS at 12:30

## 2020-03-22 RX ADMIN — IOHEXOL 100 ML: 350 INJECTION, SOLUTION INTRAVENOUS at 12:41

## 2020-03-22 RX ADMIN — SODIUM CHLORIDE 1000 ML: 9 INJECTION, SOLUTION INTRAVENOUS at 12:28

## 2020-03-22 ASSESSMENT — LIFESTYLE VARIABLES
TOTAL SCORE: 0
AVERAGE NUMBER OF DAYS PER WEEK YOU HAVE A DRINK CONTAINING ALCOHOL: 0
CONSUMPTION TOTAL: NEGATIVE
EVER_SMOKED: YES
ALCOHOL_USE: NO
HAVE YOU EVER FELT YOU SHOULD CUT DOWN ON YOUR DRINKING: NO
ON A TYPICAL DAY WHEN YOU DRINK ALCOHOL HOW MANY DRINKS DO YOU HAVE: 0
TOTAL SCORE: 0
HOW MANY TIMES IN THE PAST YEAR HAVE YOU HAD 5 OR MORE DRINKS IN A DAY: 0
HAVE PEOPLE ANNOYED YOU BY CRITICIZING YOUR DRINKING: NO
EVER FELT BAD OR GUILTY ABOUT YOUR DRINKING: NO
TOTAL SCORE: 0
EVER HAD A DRINK FIRST THING IN THE MORNING TO STEADY YOUR NERVES TO GET RID OF A HANGOVER: NO

## 2020-03-22 ASSESSMENT — PATIENT HEALTH QUESTIONNAIRE - PHQ9
SUM OF ALL RESPONSES TO PHQ9 QUESTIONS 1 AND 2: 0
1. LITTLE INTEREST OR PLEASURE IN DOING THINGS: NOT AT ALL
2. FEELING DOWN, DEPRESSED, IRRITABLE, OR HOPELESS: NOT AT ALL
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
## 2020-03-22 NOTE — ASSESSMENT & PLAN NOTE
Comminuted Fracture of the right ischium and right greater trochanter  Antibiotics X 24 hours  Non-operative management.  Weight bearing status - Weightbearing as tolerated RLE.  Dave Watkins MD. Orthopedic Surgeon. [facility].
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
## 2020-03-22 NOTE — OP REPORT
Date of Service: 03/22/20    Diagnosis: Right greater trochanteric fracture    Treatment/Procedure: Closed treatment without manipulation of right greater trochanteric fracture    Surgeon: Dave Watkins    Treatment plan: We discussed the above injury and recommended nonoperative treatment.  This would include therapy in the hospital for range of motion and ADLs.  As tolerated range of motion and as tolerated weight bearing to the injured extremity.  We will repeat xrays and check on functional status in the office in 2 weeks.
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
## 2020-03-22 NOTE — RESPIRATORY CARE
Respiratory Trauma Red Note    Intubation No    No respiratory interventions required at this time. See trauma narrator notes for full details.

## 2020-03-23 PROBLEM — Z78.9 NO CONTRAINDICATION TO DEEP VEIN THROMBOSIS (DVT) PROPHYLAXIS: Status: ACTIVE | Noted: 2020-03-23

## 2020-03-23 LAB
ANION GAP SERPL CALC-SCNC: 10 MMOL/L (ref 7–16)
BASOPHILS # BLD AUTO: 0.3 % (ref 0–1.8)
BASOPHILS # BLD: 0.03 K/UL (ref 0–0.12)
BUN SERPL-MCNC: 14 MG/DL (ref 8–22)
CALCIUM SERPL-MCNC: 8.4 MG/DL (ref 8.5–10.5)
CHLORIDE SERPL-SCNC: 104 MMOL/L (ref 96–112)
CO2 SERPL-SCNC: 22 MMOL/L (ref 20–33)
CREAT SERPL-MCNC: 0.95 MG/DL (ref 0.5–1.4)
EOSINOPHIL # BLD AUTO: 0.19 K/UL (ref 0–0.51)
EOSINOPHIL NFR BLD: 1.9 % (ref 0–6.9)
ERYTHROCYTE [DISTWIDTH] IN BLOOD BY AUTOMATED COUNT: 45.1 FL (ref 35.9–50)
GLUCOSE SERPL-MCNC: 132 MG/DL (ref 65–99)
HCT VFR BLD AUTO: 41.4 % (ref 42–52)
HGB BLD-MCNC: 13.6 G/DL (ref 14–18)
IMM GRANULOCYTES # BLD AUTO: 0.06 K/UL (ref 0–0.11)
IMM GRANULOCYTES NFR BLD AUTO: 0.6 % (ref 0–0.9)
LYMPHOCYTES # BLD AUTO: 1.04 K/UL (ref 1–4.8)
LYMPHOCYTES NFR BLD: 10.5 % (ref 22–41)
MCH RBC QN AUTO: 31 PG (ref 27–33)
MCHC RBC AUTO-ENTMCNC: 32.9 G/DL (ref 33.7–35.3)
MCV RBC AUTO: 94.3 FL (ref 81.4–97.8)
MONOCYTES # BLD AUTO: 0.95 K/UL (ref 0–0.85)
MONOCYTES NFR BLD AUTO: 9.6 % (ref 0–13.4)
NEUTROPHILS # BLD AUTO: 7.63 K/UL (ref 1.82–7.42)
NEUTROPHILS NFR BLD: 77.1 % (ref 44–72)
NRBC # BLD AUTO: 0 K/UL
NRBC BLD-RTO: 0 /100 WBC
PLATELET # BLD AUTO: 159 K/UL (ref 164–446)
PMV BLD AUTO: 11.4 FL (ref 9–12.9)
POTASSIUM SERPL-SCNC: 3.6 MMOL/L (ref 3.6–5.5)
RBC # BLD AUTO: 4.39 M/UL (ref 4.7–6.1)
SODIUM SERPL-SCNC: 136 MMOL/L (ref 135–145)
WBC # BLD AUTO: 9.9 K/UL (ref 4.8–10.8)

## 2020-03-23 PROCEDURE — 97161 PT EVAL LOW COMPLEX 20 MIN: CPT

## 2020-03-23 PROCEDURE — A9270 NON-COVERED ITEM OR SERVICE: HCPCS | Performed by: SURGERY

## 2020-03-23 PROCEDURE — 80048 BASIC METABOLIC PNL TOTAL CA: CPT

## 2020-03-23 PROCEDURE — 700111 HCHG RX REV CODE 636 W/ 250 OVERRIDE (IP): Performed by: SURGERY

## 2020-03-23 PROCEDURE — A9270 NON-COVERED ITEM OR SERVICE: HCPCS | Performed by: NURSE PRACTITIONER

## 2020-03-23 PROCEDURE — 97166 OT EVAL MOD COMPLEX 45 MIN: CPT

## 2020-03-23 PROCEDURE — 700112 HCHG RX REV CODE 229: Performed by: SURGERY

## 2020-03-23 PROCEDURE — 700102 HCHG RX REV CODE 250 W/ 637 OVERRIDE(OP): Performed by: SURGERY

## 2020-03-23 PROCEDURE — 700102 HCHG RX REV CODE 250 W/ 637 OVERRIDE(OP): Performed by: NURSE PRACTITIONER

## 2020-03-23 PROCEDURE — 36415 COLL VENOUS BLD VENIPUNCTURE: CPT

## 2020-03-23 PROCEDURE — 85025 COMPLETE CBC W/AUTO DIFF WBC: CPT

## 2020-03-23 PROCEDURE — 770006 HCHG ROOM/CARE - MED/SURG/GYN SEMI*

## 2020-03-23 RX ADMIN — ENOXAPARIN SODIUM 40 MG: 100 INJECTION SUBCUTANEOUS at 04:45

## 2020-03-23 RX ADMIN — PROPRANOLOL HYDROCHLORIDE 40 MG: 10 TABLET ORAL at 16:14

## 2020-03-23 RX ADMIN — OXYCODONE HYDROCHLORIDE 5 MG: 5 TABLET ORAL at 03:17

## 2020-03-23 RX ADMIN — DOCUSATE SODIUM 100 MG: 100 CAPSULE, LIQUID FILLED ORAL at 04:45

## 2020-03-23 RX ADMIN — ACETAMINOPHEN 650 MG: 325 TABLET, FILM COATED ORAL at 00:08

## 2020-03-23 RX ADMIN — CEFAZOLIN SODIUM 2 G: 2 INJECTION, SOLUTION INTRAVENOUS at 13:08

## 2020-03-23 RX ADMIN — LORAZEPAM 1 MG: 1 TABLET ORAL at 20:14

## 2020-03-23 RX ADMIN — DULOXETINE HYDROCHLORIDE 60 MG: 60 CAPSULE, DELAYED RELEASE ORAL at 00:08

## 2020-03-23 RX ADMIN — ACETAMINOPHEN 650 MG: 325 TABLET, FILM COATED ORAL at 13:07

## 2020-03-23 RX ADMIN — ACETAMINOPHEN 650 MG: 325 TABLET, FILM COATED ORAL at 17:43

## 2020-03-23 RX ADMIN — CEFAZOLIN SODIUM 2 G: 2 INJECTION, SOLUTION INTRAVENOUS at 04:45

## 2020-03-23 RX ADMIN — OXYCODONE HYDROCHLORIDE 5 MG: 5 TABLET ORAL at 09:39

## 2020-03-23 RX ADMIN — TAMSULOSIN HYDROCHLORIDE 0.4 MG: 0.4 CAPSULE ORAL at 00:09

## 2020-03-23 RX ADMIN — OXYCODONE HYDROCHLORIDE 5 MG: 5 TABLET ORAL at 17:43

## 2020-03-23 RX ADMIN — ACETAMINOPHEN 650 MG: 325 TABLET, FILM COATED ORAL at 04:45

## 2020-03-23 RX ADMIN — PROPRANOLOL HYDROCHLORIDE 40 MG: 10 TABLET ORAL at 00:09

## 2020-03-23 RX ADMIN — LORAZEPAM 1 MG: 1 TABLET ORAL at 00:09

## 2020-03-23 ASSESSMENT — COGNITIVE AND FUNCTIONAL STATUS - GENERAL
DRESSING REGULAR UPPER BODY CLOTHING: A LITTLE
WALKING IN HOSPITAL ROOM: A LOT
DRESSING REGULAR LOWER BODY CLOTHING: A LOT
SUGGESTED CMS G CODE MODIFIER DAILY ACTIVITY: CK
MOVING TO AND FROM BED TO CHAIR: A LITTLE
TOILETING: A LITTLE
DRESSING REGULAR LOWER BODY CLOTHING: A LITTLE
STANDING UP FROM CHAIR USING ARMS: A LOT
SUGGESTED CMS G CODE MODIFIER DAILY ACTIVITY: CK
MOBILITY SCORE: 16
DRESSING REGULAR LOWER BODY CLOTHING: A LOT
MOVING FROM LYING ON BACK TO SITTING ON SIDE OF FLAT BED: A LITTLE
DAILY ACTIVITIY SCORE: 19
HELP NEEDED FOR BATHING: A LOT
STANDING UP FROM CHAIR USING ARMS: A LITTLE
MOBILITY SCORE: 16
DAILY ACTIVITIY SCORE: 19
SUGGESTED CMS G CODE MODIFIER MOBILITY: CK
DAILY ACTIVITIY SCORE: 19
TURNING FROM BACK TO SIDE WHILE IN FLAT BAD: A LOT
HELP NEEDED FOR BATHING: A LOT
CLIMB 3 TO 5 STEPS WITH RAILING: A LOT
CLIMB 3 TO 5 STEPS WITH RAILING: A LOT
TOILETING: A LITTLE
SUGGESTED CMS G CODE MODIFIER MOBILITY: CK
SUGGESTED CMS G CODE MODIFIER DAILY ACTIVITY: CK
MOVING TO AND FROM BED TO CHAIR: A LITTLE
WALKING IN HOSPITAL ROOM: A LITTLE
TOILETING: A LITTLE
MOVING FROM LYING ON BACK TO SITTING ON SIDE OF FLAT BED: A LITTLE
HELP NEEDED FOR BATHING: A LOT

## 2020-03-23 ASSESSMENT — GAIT ASSESSMENTS
DEVIATION: ANTALGIC;STEP TO;DECREASED HEEL STRIKE;DECREASED TOE OFF;OTHER (COMMENT)
ASSISTIVE DEVICE: FRONT WHEEL WALKER
GAIT LEVEL OF ASSIST: MINIMAL ASSIST
DISTANCE (FEET): 50

## 2020-03-23 ASSESSMENT — ENCOUNTER SYMPTOMS
DIARRHEA: 0
BACK PAIN: 1
SHORTNESS OF BREATH: 0
ABDOMINAL PAIN: 0
COUGH: 0
VOMITING: 0
FEVER: 0
MYALGIAS: 1
NAUSEA: 0

## 2020-03-23 ASSESSMENT — ACTIVITIES OF DAILY LIVING (ADL)
TOILETING: INDEPENDENT
TOILETING: INDEPENDENT

## 2020-03-23 ASSESSMENT — PATIENT HEALTH QUESTIONNAIRE - PHQ9
1. LITTLE INTEREST OR PLEASURE IN DOING THINGS: NOT AT ALL
SUM OF ALL RESPONSES TO PHQ9 QUESTIONS 1 AND 2: 0
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
## 2020-03-23 NOTE — CARE PLAN
Problem: Communication  Goal: The ability to communicate needs accurately and effectively will improve  Outcome: PROGRESSING AS EXPECTED     Problem: Safety  Goal: Will remain free from falls  Outcome: PROGRESSING AS EXPECTED     Problem: Infection  Goal: Will remain free from infection  Outcome: PROGRESSING AS EXPECTED  IV abx in use     Problem: Venous Thromboembolism (VTW)/Deep Vein Thrombosis (DVT) Prevention:  Goal: Patient will participate in Venous Thrombosis (VTE)/Deep Vein Thrombosis (DVT)Prevention Measures  Outcome: PROGRESSING AS EXPECTED   SCDs on and lovenox in use    Problem: Pain Management  Goal: Pain level will decrease to patient's comfort goal  Outcome: PROGRESSING AS EXPECTED  PRN pain meds available and in use per MAR

## 2020-03-23 NOTE — PROGRESS NOTES
22:35 - SKINNY paged to get a hold of on call P.A    22:43 - Received call back from Domenica WEN. PA notified of patient's admit dx and the fact that he is still under ortho, not trauma. Also notified and asked if patient's night time medications [flomax, cymbalta, ativan and propanalol] maybe added on to the MAR for administration as patient is requesting these medications and takes them at home daily.  Pt's med rec has been completed and been verified. Allergies verified.    BEHZAD rootay-ed for me to put in pt's meds in the MAR per med rec.

## 2020-03-23 NOTE — CARE PLAN
Problem: Communication  Goal: The ability to communicate needs accurately and effectively will improve  Outcome: PROGRESSING AS EXPECTED  Note: Updated pt on POC, no concerns at this time. Educated on use of call light, calls appropriately     Problem: Pain Management  Goal: Pain level will decrease to patient's comfort goal  Outcome: PROGRESSING AS EXPECTED  Note: Pt educated on 0 to 10 pain scale, verbalizes current pain management effective, rates pain 5/10, medication given see MAR. Will continue to monitor
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
## 2020-03-24 VITALS
SYSTOLIC BLOOD PRESSURE: 94 MMHG | OXYGEN SATURATION: 96 % | DIASTOLIC BLOOD PRESSURE: 62 MMHG | HEART RATE: 75 BPM | HEIGHT: 66 IN | TEMPERATURE: 97.2 F | BODY MASS INDEX: 20.89 KG/M2 | WEIGHT: 130 LBS | RESPIRATION RATE: 16 BRPM

## 2020-03-24 PROBLEM — Z78.9 NO CONTRAINDICATION TO DEEP VEIN THROMBOSIS (DVT) PROPHYLAXIS: Status: RESOLVED | Noted: 2020-03-23 | Resolved: 2020-03-24

## 2020-03-24 LAB
BASOPHILS # BLD AUTO: 0.3 % (ref 0–1.8)
BASOPHILS # BLD: 0.03 K/UL (ref 0–0.12)
EOSINOPHIL # BLD AUTO: 0.17 K/UL (ref 0–0.51)
EOSINOPHIL NFR BLD: 1.7 % (ref 0–6.9)
ERYTHROCYTE [DISTWIDTH] IN BLOOD BY AUTOMATED COUNT: 43.3 FL (ref 35.9–50)
HCT VFR BLD AUTO: 38.8 % (ref 42–52)
HGB BLD-MCNC: 13 G/DL (ref 14–18)
IMM GRANULOCYTES # BLD AUTO: 0.07 K/UL (ref 0–0.11)
IMM GRANULOCYTES NFR BLD AUTO: 0.7 % (ref 0–0.9)
LYMPHOCYTES # BLD AUTO: 1.28 K/UL (ref 1–4.8)
LYMPHOCYTES NFR BLD: 12.9 % (ref 22–41)
MCH RBC QN AUTO: 30.7 PG (ref 27–33)
MCHC RBC AUTO-ENTMCNC: 33.5 G/DL (ref 33.7–35.3)
MCV RBC AUTO: 91.5 FL (ref 81.4–97.8)
MONOCYTES # BLD AUTO: 1.05 K/UL (ref 0–0.85)
MONOCYTES NFR BLD AUTO: 10.6 % (ref 0–13.4)
NEUTROPHILS # BLD AUTO: 7.3 K/UL (ref 1.82–7.42)
NEUTROPHILS NFR BLD: 73.8 % (ref 44–72)
NRBC # BLD AUTO: 0 K/UL
NRBC BLD-RTO: 0 /100 WBC
PLATELET # BLD AUTO: 152 K/UL (ref 164–446)
PMV BLD AUTO: 11.1 FL (ref 9–12.9)
RBC # BLD AUTO: 4.24 M/UL (ref 4.7–6.1)
WBC # BLD AUTO: 9.9 K/UL (ref 4.8–10.8)

## 2020-03-24 PROCEDURE — 700102 HCHG RX REV CODE 250 W/ 637 OVERRIDE(OP): Performed by: NURSE PRACTITIONER

## 2020-03-24 PROCEDURE — A9270 NON-COVERED ITEM OR SERVICE: HCPCS | Performed by: SURGERY

## 2020-03-24 PROCEDURE — 700102 HCHG RX REV CODE 250 W/ 637 OVERRIDE(OP): Performed by: SURGERY

## 2020-03-24 PROCEDURE — 97535 SELF CARE MNGMENT TRAINING: CPT | Mod: CQ

## 2020-03-24 PROCEDURE — A9270 NON-COVERED ITEM OR SERVICE: HCPCS | Performed by: NURSE PRACTITIONER

## 2020-03-24 PROCEDURE — 97116 GAIT TRAINING THERAPY: CPT | Mod: CQ

## 2020-03-24 PROCEDURE — 97530 THERAPEUTIC ACTIVITIES: CPT | Mod: CQ

## 2020-03-24 PROCEDURE — 700112 HCHG RX REV CODE 229: Performed by: SURGERY

## 2020-03-24 PROCEDURE — 85025 COMPLETE CBC W/AUTO DIFF WBC: CPT

## 2020-03-24 PROCEDURE — 36415 COLL VENOUS BLD VENIPUNCTURE: CPT

## 2020-03-24 PROCEDURE — 700111 HCHG RX REV CODE 636 W/ 250 OVERRIDE (IP): Performed by: SURGERY

## 2020-03-24 RX ORDER — ACETAMINOPHEN 325 MG/1
650 TABLET ORAL EVERY 6 HOURS
Qty: 30 TAB | Refills: 0 | COMMUNITY
Start: 2020-03-24

## 2020-03-24 RX ORDER — OXYCODONE HYDROCHLORIDE 5 MG/1
5 TABLET ORAL EVERY 4 HOURS PRN
Qty: 28 TAB | Refills: 0 | Status: SHIPPED | OUTPATIENT
Start: 2020-03-24 | End: 2020-03-31

## 2020-03-24 RX ADMIN — OXYCODONE HYDROCHLORIDE 5 MG: 5 TABLET ORAL at 11:57

## 2020-03-24 RX ADMIN — ACETAMINOPHEN 650 MG: 325 TABLET, FILM COATED ORAL at 11:57

## 2020-03-24 RX ADMIN — OXYCODONE HYDROCHLORIDE 5 MG: 5 TABLET ORAL at 05:07

## 2020-03-24 RX ADMIN — DULOXETINE HYDROCHLORIDE 60 MG: 60 CAPSULE, DELAYED RELEASE ORAL at 04:44

## 2020-03-24 RX ADMIN — PROPRANOLOL HYDROCHLORIDE 40 MG: 10 TABLET ORAL at 04:44

## 2020-03-24 RX ADMIN — ENOXAPARIN SODIUM 40 MG: 100 INJECTION SUBCUTANEOUS at 04:45

## 2020-03-24 RX ADMIN — ACETAMINOPHEN 650 MG: 325 TABLET, FILM COATED ORAL at 04:45

## 2020-03-24 RX ADMIN — TAMSULOSIN HYDROCHLORIDE 0.4 MG: 0.4 CAPSULE ORAL at 04:44

## 2020-03-24 RX ADMIN — DOCUSATE SODIUM 100 MG: 100 CAPSULE, LIQUID FILLED ORAL at 04:44

## 2020-03-24 ASSESSMENT — ENCOUNTER SYMPTOMS
ABDOMINAL PAIN: 0
MYALGIAS: 1
FEVER: 0
DIARRHEA: 0
BACK PAIN: 1
NAUSEA: 0
VOMITING: 0
SHORTNESS OF BREATH: 0
COUGH: 0

## 2020-03-24 ASSESSMENT — GAIT ASSESSMENTS
DEVIATION: ANTALGIC;STEP TO
GAIT LEVEL OF ASSIST: SUPERVISED
DISTANCE (FEET): 30
ASSISTIVE DEVICE: FRONT WHEEL WALKER

## 2020-03-24 ASSESSMENT — COGNITIVE AND FUNCTIONAL STATUS - GENERAL
TURNING FROM BACK TO SIDE WHILE IN FLAT BAD: A LITTLE
STANDING UP FROM CHAIR USING ARMS: A LITTLE
WALKING IN HOSPITAL ROOM: A LITTLE
CLIMB 3 TO 5 STEPS WITH RAILING: A LITTLE
MOVING FROM LYING ON BACK TO SITTING ON SIDE OF FLAT BED: A LITTLE
MOVING TO AND FROM BED TO CHAIR: A LITTLE
SUGGESTED CMS G CODE MODIFIER MOBILITY: CK
MOBILITY SCORE: 18
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
## 2020-03-24 NOTE — THERAPY
"Physical Therapy Treatment completed.   Bed Mobility:  Supine to Sit: Supervised  Transfers: Sit to Stand: Supervised(from EOB->FWW)  Gait: Level Of Assist: Supervised with Front-Wheel Walker       Plan of Care: Patient with no further skilled PT needs in the acute care setting at this time  Discharge Recommendations: Equipment: Delivered BS. Post-acute therapy Currently anticipate no further skilled therapy needs once patient is discharged from the inpatient setting.     See \"Rehab Therapy-Acute\" Patient Summary Report for complete documentation.       "

## 2020-03-24 NOTE — DISCHARGE PLANNING
Agency/Facility Name: [facility]  Spoke To: fax  Outcome: Pt accepted. [facility] will advise of DC date.    RN CM informed

## 2020-03-24 NOTE — DISCHARGE INSTRUCTIONS
Discharge Instructions    Discharged to home by car with relative. Discharged via wheelchair, hospital escort: Yes.  Special equipment needed: Walker    Be sure to schedule a follow-up appointment with your primary care doctor or any specialists as instructed.     Discharge Plan:   Diet Plan: Discussed  Activity Level: Discussed  Confirmed Follow up Appointment: Patient to Call and Schedule Appointment  Confirmed Symptoms Management: Discussed  Medication Reconciliation Updated: Yes  Influenza Vaccine Indication: Patient Refuses    I understand that a diet low in cholesterol, fat, and sodium is recommended for good health. Unless I have been given specific instructions below for another diet, I accept this instruction as my diet prescription.   Other diet: regular    Special Instructions: None    · Is patient discharged on Warfarin / Coumadin?   No     Depression / Suicide Risk    As you are discharged from this [facility] facility, it is important to learn how to keep safe from harming yourself.    Recognize the warning signs:  · Abrupt changes in personality, positive or negative- including increase in energy   · Giving away possessions  · Change in eating patterns- significant weight changes-  positive or negative  · Change in sleeping patterns- unable to sleep or sleeping all the time   · Unwillingness or inability to communicate  · Depression  · Unusual sadness, discouragement and loneliness  · Talk of wanting to die  · Neglect of personal appearance   · Rebelliousness- reckless behavior  · Withdrawal from people/activities they love  · Confusion- inability to concentrate     If you or a loved one observes any of these behaviors or has concerns about self-harm, here's what you can do:  · Talk about it- your feelings and reasons for harming yourself  · Remove any means that you might use to hurt yourself (examples: pills, rope, extension cords, firearm)  · Get professional help from the community (Mental  Health, Substance Abuse, psychological counseling)  · Do not be alone:Call your Safe Contact- someone whom you trust who will be there for you.  · Call your local CRISIS HOTLINE 651-4813 or 791-220-2993  · Call your local Children's Mobile Crisis Response Team [place] (276) 752-9360 or www.Bebestore  · Call the toll free National Suicide Prevention Hotlines   · National Suicide Prevention Lifeline 517-070-QKJQ (3556)  · [employer] 800-SUICIDE (950-5514)      Gunshot Wound  Gunshot wounds can cause a lot of bleeding and damage to your tissues and organs. They can cause broken bones (fractures). The wounds can also get infected. The amount of damage depends on where the injury is. It also depends on the type of bullet and how deeply the bullet went into the body.  Follow these instructions at home:  If you have a splint:  · Wear the splint as told by your doctor. Remove it only as told by your doctor.  · Loosen the splint if your fingers or toes tingle, get numb, or turn cold and blue.  · Do not let your splint get wet if it is not waterproof.  · Keep the splint clean.  Wound care  · Follow instructions from your doctor about how to take care of your wound. Make sure you:  ¨ Wash your hands with soap and water before you change your bandage (dressing). If you cannot use soap and water, use hand .  ¨ Change your bandage as told by your doctor.  ¨ Leave stitches (sutures), skin glue, or skin tape (adhesive) strips in place. They may need to stay in place for 2 weeks or longer. If tape strips get loose and curl up, you may trim the loose edges. Do not remove tape strips completely unless your doctor says it is okay.  · Keep the wound area clean and dry. Do not take baths, swim, or use a hot tub until your doctor says it is okay.  · Check your wound every day for signs of infection. Check for:  ¨ More redness, swelling, or pain.  ¨ More fluid or blood.  ¨ Warmth.  ¨ Pus or a bad  smell.  Activity  · Rest the injured body part for the next 2-3 days or for as long as told by your doctor.  · Return to your normal activities as told by your doctor. Ask your doctor what activities are safe for you.  · Do not drive or use heavy machinery while taking prescription pain medicine.  Medicine  · Take over-the-counter and prescription medicines only as told by your doctor.  · If you were prescribed an antibiotic medicine, take it or apply it as told by your doctor. Do not stop using it even if you get better.  General instructions  · If you can, raise (elevate) your injured body part above the level of your heart while you are sitting or lying down. This will help cut down on pain and swelling.  · Keep all follow-up visits as told by your doctor. This is important.  Contact a doctor if:  · You have more redness, swelling, or pain around your wound.  · You have more fluid or blood coming from your wound.  · Your wound feels warm to the touch.  · You have pus or a bad smell coming from your wound.  · You have a fever.  Get help right away if:  · You feel short of breath.  · You have very bad pain in your chest or belly.  · You pass out (faint) or feel like you may pass out.  · You have bleeding that is hard to stop or control.  · You have chills.  · You feel sick to your stomach (nauseous) or you throw up (vomit).  · You lose feeling (have numbness) or have weakness in the injured area.  This information is not intended to replace advice given to you by your health care provider. Make sure you discuss any questions you have with your health care provider.  Document Released: 04/03/2012 Document Revised: 07/07/2017 Document Reviewed: 03/17/2017  [employer] Interactive Patient Education © 2017 [employer]

## 2020-03-24 NOTE — DISCHARGE PLANNING
Agency/Facility Name: [facility]  Spoke To: Rep  Outcome: Advised service of pt DC 3/24. Awaiting callback      RN ELAINA informed

## 2020-03-24 NOTE — DISCHARGE PLANNING
Received Choice form at 1961  Agency/Facility Name: [facility]  Referral sent per Choice form @ [id number]

## 2020-03-24 NOTE — CARE PLAN
Problem: Communication  Goal: The ability to communicate needs accurately and effectively will improve  Outcome: PROGRESSING AS EXPECTED  Note: Updated pt on POC, no questions at this time. Educated pt about use of call light, calls appropriately     Problem: Safety  Goal: Will remain free from falls  Outcome: PROGRESSING AS EXPECTED  Note: Educated pt on use of call light, bed locked and in lowest position, nonskid socks on, hourly rounding in place

## 2020-03-24 NOTE — DISCHARGE PLANNING
Agency/Facility Name: [facility]  Spoke To: Vaughn  Outcome: Vaughn will reach out to pt to get HH set up.    RN ELAINA informed
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
## 2020-03-24 NOTE — PROGRESS NOTES
Pt. Is given discharge information, educated about medications, educated about following up with upcoming appointments, transported out with RN.  Pt. Iv discontinued.   Pt. Had no further questions  Pt. Given extra supplies for site care

## 2021-01-15 DIAGNOSIS — Z23 NEED FOR VACCINATION: ICD-10-CM

## 2023-10-20 NOTE — PROGRESS NOTES
HISTORY AND PHYSICAL EXAM  Trauma Team      HISTORY:   The patient is a 89 year old female with history of dementia, hypertension, diabetes, HFpEF, and atrial fibrillation on Eliquis presents as a trauma activation after unwitnessed fall. No loss of consciousness. No signs of acute injury.      A: Intact and patent  B: Breath sounds equal bilaterally  C: Palpable Radial and Dorsalis Pedis pulses  D: GCS-14, pupils equal, round, and reactive to light.   E: No abrasions or laceration. No major deformities. No stepoffs.          Social History     Tobacco Use   • Smoking status: Never   • Smokeless tobacco: Never   Vaping Use   • Vaping Use: never used   Substance Use Topics   • Alcohol use: No   • Drug use: No            ALLERGIES:  No Known Allergies     Past Medical History:   Diagnosis Date   • Acute heart failure with preserved ejection fraction (HFpEF) (CMD) 10/10/2023   • Cardiomyopathy, unspecified (CMD) 10/11/2023   • Diabetes mellitus (CMD)    • Elevated troponin I level 10/10/2023   • Essential (primary) hypertension    • Fracture    • GERD (gastroesophageal reflux disease)    • Hyperlipidemia    • Hypertension with heart disease 10/10/2023   • Nonrheumatic mitral valve regurgitation 10/11/2023   • NSTEMI (non-ST elevated myocardial infarction) (CMD) 10/10/2023   • PAF (paroxysmal atrial fibrillation) (CMD) 10/10/2023   • Uncomplicated senile dementia (CMD)    • Wound dehiscence, external operation 02/20/2019        Past Surgical History:   Procedure Laterality Date   • Abdominal aortic aneurysm repair, endovascular     • Fracture surgery     • Hysterectomy                                           ROS:        Constitutional:  No fevers, chills, night sweats, or weight loss      HEENT:   No sore throat, headache, dizziness, vertigo, ear pain, swollen glands, neck pain, pain swallowing, difficulty swallowing, hoarseness, sinus pain/pressure, nasal discharge, tooth pain, visual change, or loss of hearing       Progress Note               Author: Vaughn Tuttle Date & Time created: 2017  8:26 AM     Interval History:  POD #1 s/p L3-S1 PSI/PSF for back pain, lumbar radiculopathy  Doing very well. Pain managed on PCA with [facility] MS Contin. Denies BLE pain. Has some paresthesias of the right thigh which he claims are slightly increased from baseline.   Eating well.   Has already ambulated with PT    Review of Systems:  ROS   Denies new pain, numbness, weakness.   Denies HA, positional HA, N/V, dizziness, chest pain, SOB, difficulty breathing, fevers, chills  Denies unilateral LE pain.       Physical Exam:  Physical Exam   VSS  A&Ox4, NAD  NM: 5/5 hip flexion, knee extension, knee flexion, plantar flexion, dorsiflexion, EHL  Sensation decreased to light touch right anterior thigh, left foot   Abdomen: soft, non-tender  Non-labored breathing on room air, normal respiratory effort  Capillary refill in all four extremities <2 seconds  No LE edema, erythema, cyanosis, clubbing  Calves non-tender to compression bilat  Incision CDI, no halo sign  Drain: 145 past 12 hours          Labs:        Invalid input(s): [id number]      Recent Labs      17   0335   SODIUM  140   POTASSIUM  4.8   CHLORIDE  107   CO2  29   BUN  15   CREATININE  1.05   CALCIUM  8.7     Recent Labs      17   0335   GLUCOSE  124*     Recent Labs      17   0335   RBC  4.23*   HEMOGLOBIN  13.2*   HEMATOCRIT  40.0*   PLATELETCT  191     Recent Labs      17   0335   WBC  20.2*     Hemodynamics:  Temp (24hrs), Av.8 °C (98.2 °F), Min:36.1 °C (97 °F), Max:37.5 °C (99.5 °F)  Temperature: 37.5 °C (99.5 °F)  Pulse  Av.5  Min: 75  Max: 140Heart Rate (Monitored): 98  Blood Pressure : 103/60 mmHg, NIBP: 104/70 mmHg     Respiratory:    Respiration: 16, Pulse Oximetry: 100 %, O2 Daily Delivery Respiratory : Silicone Nasal Cannula           Fluids:    Intake/Output Summary (Last 24 hours) at 17 0886  Last data filed at 17  0700   Gross per 24 hour   Intake 4550.5 ml   Output   2985 ml   Net 1565.5 ml     Weight: 62.3 kg (137 lb 5.6 oz)  GI/Nutrition:  Orders Placed This Encounter   Procedures   • DIET ORDER     Standing Status: Standing      Number of Occurrences: 1      Standing Expiration Date:      Order Specific Question:  Diet:     Answer:  Regular [1]     Order Specific Question:  Diet:     Answer:  Diabetic [3]     Medical Decision Making, by Problem:  Active Hospital Problems    Diagnosis   • Spinal stenosis, lumbar region, without neurogenic claudication [M48.06]       Plan:  POD #1 s/p L3-S1 PSI/PSF for back pain, lumbar radiculopathy  Plan  D/c PCA, Add Norco  Ambulate 3-5 times today, short walks.  Sit in chair for 30 minutes at a time  D/C Cook  Repeat labs in am  Continue stool softeners, follow bowels  Follow exam     Vaughn Tuttle NP      Core Measures      I agree with the clinical finding along with the assessment and plan as recorded above by Vaughn and directed by myself.  I personally examined the patient and clinical data.      Ken Booth MD MSc     Cardiac:   No chest pain, chest pressure, palpitations, SOB at rest, BOYER, orthopnea, PND, LE edema, or exertional symptoms      Pulmonary:  No dry cough, productive cough, SOB at rest, BOYER, wheezing, or pleuritic chest pain       GI:  No nausea, vomiting, constipation, diarrhea, constipation, abdominal pain, cramping, LGI gas, belching, sour taste, indigestion, BRBPR, or melena      Urinary:  No dysuria, hematuria, frequency, hesitancy, nocturia, urgency, incontinence, or flank pain      Extremities:  No clubbing, cyanosis, unilateral edema, bilateral edema, or varicose veins      Musculoskeletal:  No joint pain, swelling, erythema, back pain, radicular pain, neck pain, or decreased range of motion      Neurologic:  No numbness, tingling, focal weakness, difficulty with speech, or memory problems      Vascular:  No intermittent claudication      Dermatologic:  No rash, moles, or itching    Family History   Problem Relation Age of Onset   • Patient is unaware of any medical problems Mother    • Patient is unaware of any medical problems Father                                                   No LMP recorded. Patient has had a hysterectomy.                                         No current facility-administered medications for this encounter.     Current Outpatient Medications   Medication Sig Dispense Refill   • atorvastatin (LIPITOR) 40 MG tablet Take 1 tablet by mouth daily. Do not start before October 15, 2023. 30 tablet 0   • apixaBAN (ELIQUIS) 2.5 MG Tab Take 1 tablet by mouth every 12 hours. 60 tablet 0   • clopidogrel (PLAVIX) 75 MG tablet Take 1 tablet by mouth daily. Do not start before October 15, 2023. 30 tablet 0   • furosemide (LASIX) 40 MG tablet Take 1 tablet by mouth daily. Do not start before October 15, 2023. 30 tablet 0   • metoPROLOL succinate (TOPROL-XL) 25 MG 24 hr tablet Take 1 tablet by mouth daily. Do not start before October 15, 2023. 30 tablet 0   • sacubitril-valsartan (ENTRESTO) 24-26  MG per tablet Take 1 tablet by mouth 2 times daily. 60 tablet 0   • Januvia 100 MG tablet Take 100 mg by mouth daily.     • docusate sodium-sennosides (SENOKOT S) 50-8.6 MG per tablet Take 1 tablet by mouth nightly.     • hydrOXYzine (ATARAX) 50 MG tablet Take 50 mg by mouth 3 times daily as needed.     • moxifloxacin (VIGAMOX) 0.5 % ophthalmic solution 1 drop daily. 3 mL 0   • omega-3 acid ethyl esters (LOVAZA) 1 g capsule Take 2 capsules by mouth in the morning and 2 capsules in the evening. 360 capsule 3   • vitamin B-12 (CYANOCOBALAMIN) 1000 MCG tablet Take 1 tablet by mouth daily.     • polyethylene glycol (MIRALAX) 17 g packet Take 17 g by mouth daily as needed (constipation). Stir and dissolve powder in any 4 to 8 ounces of beverage, then drink. 12 each 1        Prior to Admission medications    Medication Sig Start Date End Date Taking? Authorizing Provider   atorvastatin (LIPITOR) 40 MG tablet Take 1 tablet by mouth daily. Do not start before October 15, 2023. 10/15/23   Memo William MD   apixaBAN (ELIQUIS) 2.5 MG Tab Take 1 tablet by mouth every 12 hours. 10/14/23   Memo William MD   clopidogrel (PLAVIX) 75 MG tablet Take 1 tablet by mouth daily. Do not start before October 15, 2023. 10/15/23   Memo William MD   furosemide (LASIX) 40 MG tablet Take 1 tablet by mouth daily. Do not start before October 15, 2023. 10/15/23   Memo William MD   metoPROLOL succinate (TOPROL-XL) 25 MG 24 hr tablet Take 1 tablet by mouth daily. Do not start before October 15, 2023. 10/15/23   Memo William MD   sacubitril-valsartan (ENTRESTO) 24-26 MG per tablet Take 1 tablet by mouth 2 times daily. 10/14/23   Memo William MD   Januvia 100 MG tablet Take 100 mg by mouth daily. 9/28/23   Provider, Outside   docusate sodium-sennosides (SENOKOT S) 50-8.6 MG per tablet Take 1 tablet by mouth nightly.    Provider, Outside   hydrOXYzine (ATARAX) 50 MG tablet Take 50 mg by mouth 3 times daily as needed.    Provider, Outside    moxifloxacin (VIGAMOX) 0.5 % ophthalmic solution 1 drop daily. 4/21/23   Cindy Simms MD   omega-3 acid ethyl esters (LOVAZA) 1 g capsule Take 2 capsules by mouth in the morning and 2 capsules in the evening. 4/21/23   Cindy Simms MD   vitamin B-12 (CYANOCOBALAMIN) 1000 MCG tablet Take 1 tablet by mouth daily. 4/21/23   Cindy Simms MD   polyethylene glycol (MIRALAX) 17 g packet Take 17 g by mouth daily as needed (constipation). Stir and dissolve powder in any 4 to 8 ounces of beverage, then drink. 4/21/23   Cindy Simms MD                      PHYSICAL EXAMINATION:        There were no vitals filed for this visit.      HEENT: Normocephalic, atraumatic, conjugate gaze, PERRL, oropharynx clear, tympanic membranes clear.  NECK: Backboard, C-Collar, no midline cervical spine tenderness, trachea midline  LUNGS: Clear and equal to auscultation bilaterally  COR:   Regular rate and rhythm, no extra heart sounds  ABD:   Soft, non-distended, normacative bowel sounds, non-tender, no hepatosplenomegaly, no masses palpable  BACK: Tenderness to thoracic spine, no lesions, no stepoffs   :        No gross blood  RECT.:  Hemeoccult negative, normal sphincter tone. No gross blood  PELVIS: No A/P or lateral pelvic compression tenderness, stable  EXT:       Atraumatic; intact peripheral pulses; no clubbing, cyanosis or edema  NEURO: Alert; moving all extremities; no focal neuro deficits                    MERCEDES COMA SCALE:  Eyes:   4  Verbal: 4  Motor:            6  Total:     14    FAST exam  Negative x4. No fluid or abnormalities visualized in the pericardial, hepatorenal, splenorenal or pelvic regions.       Imaging:  XR CHEST AP OR PA    Result Date: 10/10/2023  Narrative: Examination: Chest radiograph, 1 view. Clinical Information: Dyspnea Comparison: Chest x-ray 1010 2023 to 630 hours Findings: Support tubes and lines: EKG leads overlie the chest. Heart, mediastinum, and  pulmonary vasculature: Stable cardiomediastinal silhouette. Lungs and pleura: Improved aeration of the lungs with decrease in the interstitial opacities. No new focal consolidation. No significant pleural effusion or pneumothorax. Bones: No acute abnormality. Other findings: None.     Impression: Impression: Improvement in pulmonary interstitial opacities compared to earlier today. Electronically Signed by: ELÍAS ACEVES M.D. Signed on: 10/10/2023 9:26 PM Workstation ID: [id number]    TRANSTHORACIC ECHO (TTE) COMPLETE W/ W/O IMAGING AGENT    Impression: *[facility]* [address] [place] [address] Phone [phone] Fax [phone] Transthoracic Echocardiogram (TTE) Patient: Valentina Redman     Study Date/Time:        Oct 10 2023 1:36PM MRN:     [id number]             FIN#:                   [id number] :     1933          Ht/Wt:                  150cm 55kg Age:     89                  BSA/BMI:                1.53m^2 24.4kg/m^2 Gender:  F                   Baseline BP:            112 / 78 Ordering Physician:   Zaki Reynoso  Referring Physician:  Zaki Reynoso Elena  Attending Physician:  Sven Figueroa Performing Physician: Broderick Pereira MD Diagnostic Physician: Broderick Pereira MD Sonographer:          Prabhu Villatoro OLEGARIO  Fellow:               Xavi Diaz  ------------------------------------------------------------------------------ INDICATIONS:   Dyspnea. ------------------------------------------------------------------------------ STUDY CONCLUSIONS SUMMARY: 1. Left ventricle: The cavity size is normal. Wall thickness is normal.    Systolic function is severely reduced. The average 2D speckle global    longitudinal strain is abnormal. The global longitudinal strain value is    -5%. The ejection fraction was measured by biplane method of disks.    Hypokinesis of the entireanterolateral wall. Hypokinesis of the     mid-apicalinferolateral and apical walls. Hypokinesis of the    mid-apicalanteroseptal wall. Grade II diastolic dysfunction. Doppler    parameters are consistent with high ventricular filling pressure. There is    no evidence of a thrombus revealed by acoustic contrast opacification. The    ejection fraction is 30%. The average E/e' ratio is 38. 2. Aortic valve: Right coronary cusp and left coronary cusp mobility is    restricted. Velocity is increased less than expected, due to low cardiac    output. There is no stenosis. 3. Mitral valve: There is moderate regurgitation, with multiple jets, directed    centrally, due to functional valve disease. 4. Right ventricle: The cavity size is normal. Systolic function is low normal    by visual assessment. Systolic pressure is mildly to moderately increased.    The estimated peak pressure is 46mm Hg. 5. Inferior vena cava: The IVC is dilated. Respirophasic diameter changes are    in the normal range (>= 50%). 6. Pericardium, extracardiac: A small, free-flowing pericardial effusion is    identified anterior to the heart and along the right ventricular free wall.    The fluid has no internal echoes. There is no evidence of hemodynamic    compromise. IMPRESSIONS:   The study shows worsening since the study of 04/22/2022. Severely reduced LVEF with regional wall motion abnormalities as described above. Moderate MR Mild pulmonary hypertension. ------------------------------------------------------------------------------ STUDY DATA:  Comparison is made to the study of 04/22/2022.  Procedure:  A transthoracic echocardiogram was performed. Image quality was adequate. The study was technically limited due to poor acoustic window availability. Intravenous contrast (Definity 2ml) was administered to opacify the left ventricle.  M-mode, complete 2D, complete spectral Doppler, color Doppler, and strain imaging.  Study status:  Routine.  Study completion:  There were no complications.  FINDINGS BASELINE ECG:   Normal sinus rhythm. LEFT VENTRICLE:  Well visualized. The cavity size is normal. Wall thickness is normal. There is no evidence of hypertrophy. Systolic function is severely reduced. The average 2D speckle global longitudinal strain is abnormal. The global longitudinal strain value is -5%.  Hypokinesis of the entireanterolateral wall.  Hypokinesis of the mid-apicalinferolateral and apical walls.  Hypokinesis of the mid-apicalanteroseptal wall.  There is no evidence of a thrombus revealed by acoustic contrast opacification.    The ejection fraction was measured by biplane method of disks. The ejection fraction is 30%. The tissue Doppler parameters are abnormal. Grade II diastolic dysfunction. Doppler parameters are consistent with high ventricular filling pressure. AORTIC VALVE:  Well visualized. The annulus is mildly to moderately calcified. The valve is functionally bicuspid. The leaflets are mildly thickened and mildly calcified. Cusp separation is mildly reduced.  Right coronary cusp and left coronary cusp mobility is restricted. Velocity is increased less than expected, due to low cardiac output. There is no stenosis. There is trivial regurgitation. The peak systolic gradient is 8mm Hg. The ratio of LVOT to aortic valve peak velocity is 0.47. The valve area is 1.1cm^2. The valve area index is 0.71cm^2/m^2. AORTA: Aortic root: The root is normal-sized and calcified. Ascending aorta: The vessel is normal-sized and calcified. Descending aorta: The vessel is normal-sized. MITRAL VALVE:  Well visualized. The annulus is mildly calcified. The leaflets are mildly thickened and mildly calcified. Leaflet separation is normal. Mobility is not restricted. Inflow velocity is within the normal range. There is no evidence for stenosis. There is moderate regurgitation, with multiple jets, directed centrally, due to functional valve disease. The peak diastolic gradient is 9mm Hg. ATRIAL SEPTUM:  Well  visualized. The septum is normal.  Color doppler shows no obvious shunt. LEFT ATRIUM:  Well visualized. The atrium is mildly to moderately dilated. RIGHT VENTRICLE:  The cavity size is normal. Systolic function is low normal by visual assessment. The TAPSE is reduced, suggestive of RV systolic dysfunction.  Systolic pressure is mildly to moderately increased. The estimated peak pressure is 46mm Hg.       The RV pressure during systole is 46mm Hg. VENTRICULAR SEPTUM:   Thickness is normal. There is no diastolic flattening and no systolic flattening.  There is no evidence of a ventricular septal defect. PULMONIC VALVE:   Not well visualized. Velocity is within the normal range. There is trivial regurgitation. TRICUSPID VALVE:  Well visualized. Leaflet separation is normal. There is mild regurgitation. PULMONARY ARTERIES: Not well visualized. The main pulmonary artery is normal-sized. RIGHT ATRIUM:  Well visualized. The atrium is normal in size. Eustachian valve noted.       The estimated central venous pressure is 8mm Hg. PERICARDIUM:  A small, free-flowing pericardial effusion is identified anterior to the heart and along the right ventricular free wall. The fluid has no internal echoes. There is no evidence of hemodynamic compromise. SYSTEMIC VEINS: Inferior vena cava: The IVC is dilated.  Respirophasic diameter changes are in the normal range (>= 50%). ------------------------------------------------------------------------------ Measurements  Left ventricle            Value        Ref       04/22/2022  Left atrium continued      Value          Ref       04/22/2022  ROCÍO, LAX chord        (N) 4.3   cm     3.8 - 5.2 2.9         AP dim index, ES       (H) 2.5   cm/m^2   1.5 - 2.3 ----------  ESD, LAX chord        (H) 3.8   cm     2.2 - 3.5 1.9         Area ES, A4C           (H) 22    cm^2     <=20      15  ROCÍO/bsa, LAX chord    (N) 2.8   cm/m^2 2.3 - 3.1 1.9         Area/bsa ES, A4C           14.22 cm^2/m^2 ---------  ----------  ESD/bsa, LAX chord    (H) 2.5   cm/m^2 1.3 - 2.1 1.2         Area ES, A2C               21    cm^2     --------- 16  PW, ED, LAX           (N) 0.6   cm     0.6 - 0.9 0.9         Area/bsa ES, A2C           13.63 cm^2/m^2 --------- ----------  ROCÍO major ax, A4C         7.6   cm     --------- 6.4         Vol, ES, 1-p A4C       (H) 66    ml       22 - 52   38  ESD major ax, A4C         7.4   cm     --------- 5.5         Vol/bsa, ES, 1-p A4C   (H) 43    ml/m^2   11 - 40   25  FS major axis, A4C        3     %      --------- 14          Vol, ES, 1-p A2C       (H) 62    ml       22 - 52   43  ROCÍO/bsa major ax, A4C     5.0   cm/m^2 --------- 4.3         Vol/bsa, ES, 1-p A2C   (H) 41    ml/m^2   13 - 40   28  ESD/bsa major ax, A4C     4.8   cm/m^2 --------- 3.7         Vol, ES, 2-p               65    ml       --------- 40  JONATHAN, A4C                  30.7  cm^2   --------- 22.8        Vol/bsa, ES, 2-p       (H) 42    ml/m^2   16 - 34   27  BRITTNEY, A4C                  25.3  cm^2   --------- 10.4  FAC, A4C                  18    %      --------- 54          Aortic valve               Value          Ref       04/22/2022  IVS, ED               (N) 0.6   cm     0.6 - 0.9 0.9         Peak v, S                  1.4   m/sec    --------- 2.1  PW, ED                (N) 0.6   cm     0.6 - 0.9 0.9         Peak grad, S               8     mm Hg    --------- 18  IVS/PW, ED                1.12         --------- 0.98        LVOT/AV, Vpeak ratio       0.47           --------- 0.57  EDV                   (N) 81    ml     46 - 106  24          DONTE, Vmax                  1.1   cm^2     --------- 1.2  ESV                   (H) 54    ml     14 - 42   7           DONTE/bsa, Vmax              0.71  cm^2/m^2 --------- 0.76  EF                    (L) 30    %      54 - 74   70  SV                        23    ml     --------- 21          Mitral valve               Value          Ref       04/22/2022  EDV/bsa               (N) 53    ml/m^2 29 -  61   16          Peak E                     1.5   m/sec    --------- 0.81  ESV/bsa               (H) 36    ml/m^2 8 - 24    4           Peak A                     1.23  m/sec    --------- 1.63  SV/bsa                    20    ml/m^2 --------- 14          Decel time                 158   ms       --------- 637  SV, 1-p A4C               29    ml     --------- 48          Peak grad, D               9     mm Hg    --------- 3  SV/bsa, 1-p A4C           19    ml/m^2 --------- 32          Peak E/A ratio             1.2            --------- ----------  EDV, 2-p              (N) 87    ml     46 - 106  49          MR vol, LVOT cont          8     ml       --------- ----------  ESV, 2-p              (H) 62    ml     14 - 42   15          MR peak v                  4.86  m/sec    --------- ----------  SV, 2-p                   25    ml     --------- 34          Peak LV-LA grad S          94    mm Hg    --------- ----------  EDV/bsa, 2-p          (N) 57    ml/m^2 29 - 61   32          Max MR v                   4.81  m/sec    --------- ----------  ESV/bsa, 2-p          (H) 41    ml/m^2 8 - 24    10          Peak LV-LA grad S          93    mm Hg    --------- ----------  SV/bsa, 2-p               16.3  ml/m^2 --------- 22.6        Regurg VTI                 138.0 cm       --------- ----------  E', lat lisa, TDI      (L) 5.0   cm/sec >=10.0    3.3         ERO, PISA                  0.06  cm^2     --------- ----------  E/e', lat lisa, TDI    (H) 30           <=13      ----------  E', med lisa, TDI      (L) 2.9   cm/sec >=7.0     4.2         Tricuspid valve            Value          Ref       04/22/2022  E/e', med lisa, TDI        51           --------- ----------  TR peak v              (H) 3.1   m/sec    <=2.8     ----------  E', avg, TDI              3.94  cm/sec --------- ----------  Peak RV-RA grad, S         38    mm Hg    --------- ----------  E/e', avg, TDI        (H) 38           <=14      ----------                                                                Aortic root                Value          Ref       04/22/2022  LVOT                      Value        Ref       04/22/2022  Root diam, ED          (L) 1.7   cm       2.3 - 3.8 ----------  Diam, S                   1.7   cm     --------- ----------  S-T junct diam, ED     (L) 1.7   cm       2.0 - 3.2 ----------  Area                      2.3   cm^2   --------- 2.0         S-T junct diam/bsa, ED (N) 1.1   cm/m^2   1.1 - 1.9 ----------  Peak сергей, S               0.68  m/sec  --------- 1.21  Peak grad, S              2     mm Hg  --------- 6           Ascending aorta            Value          Ref       04/22/2022                                                               AAo AP diam, ED        (N) 2.4   cm       1.9 - 3.5 ----------  Right ventricle           Value        Ref       04/22/2022  AAo AP diam/bsa, ED    (N) 1.6   cm/m^2   1.0 - 2.2 ----------  ROCÍO, LAX                  3.4   cm     --------- ----------  TAPSE, MM             (L) 1.1   cm     >=1.7     1.9         Pulmonary artery           Value          Ref       04/22/2022  Pressure, S               46    mm Hg  --------- ----------  Pressure, S                46    mm Hg    --------- ----------   Left atrium               Value        Ref       04/22/2022  Systemic veins             Value          Ref       04/22/2022  AP dim, ES            (N) 3.8   cm     2.7 - 3.8 2.8         Estimated CVP              8     mm Hg    --------- 3 Legend: (L)  and  (H)  manisha values outside specified reference range. (N)  marks values inside specified reference range. Prepared and electronically signed by Manisha Pereira MD 10/10/2023 17:48 Prior Signatures: Preliminary Reviewed by Xavi Diaz - 10/10/2023 4:53:29 PM    XR CHEST PA OR AP 1 VIEW    Result Date: 10/10/2023  Narrative: EXAM: XR CHEST AP OR PA CLINICAL INDICATION: Fever. COMPARISON: April 2023.     Impression: FINDINGS AND IMPRESSION: Poor inspiratory effort.  Heart size prominent. Calcification of the aortic arch. Prominence and redistribution of the vasculature as well as perihilar haziness and increased interstitial markings secondary to vascular congestive changes and interstitial edema pattern. Follow-up advised. No pleural effusion or pneumothorax. Electronically Signed by: FABIENNE LEONG M.D. Signed on: 10/10/2023 6:40 AM Workstation ID: [id number]        IMPRESSION:  The patient is a 89 year old female with history of dementia, hypertension, diabetes, HFpEF, and atrial fibrillation on Eliquis presents as a trauma activation after unwitnessed fall.     Imaging  -CT head without contrast shows no intracranial hemorrhage  -CT cervical spine without contrast shows no cervical fracture  -CT chest/abdomen/pelvis without contrast shows no acute traumatic injuries  -Chest xray shows no acute injuries  -Pelvis xray shows no acute injuries     Injuries  -Fall  -Blunt head trauma    Incidental Findings  -Cholelithiasis  -Small to moderated sized pericardial effusion    Plan  -Admit to Trauma Surgery for observation  -Repeat CT head in 6 hours  -Clear cervical collar clinically     Discussed with attending Dr. Bety Allison  PGY3, Surgery  Please contact at

## (undated) DEVICE — SUTURE 2-0 VICRYL PLUS CT-1 - 8 X 18 INCH(12/BX)

## (undated) DEVICE — LEAD SET 6 DISP. EKG NIHON KOHDEN (100EA/CA) [9859].

## (undated) DEVICE — PACK JACKSON TABLE KIT W/OUT - HR (6EA/CA)

## (undated) DEVICE — CANISTER SUCTION 3000ML MECHANICAL FILTER AUTO SHUTOFF MEDI-VAC NONSTERILE LF DISP  (40EA/CA)

## (undated) DEVICE — PATTIES SURG X-RAYCOTTONOID - 1/2 X 3 IN (200/CA)

## (undated) DEVICE — HEADREST PRONEVIEW LARGE - (10/CA)

## (undated) DEVICE — DRAPE STRLE REG TOWEL 18X24 - (10/BX 4BX/CA)"

## (undated) DEVICE — SODIUM CHL IRRIGATION 0.9% 1000ML (12EA/CA)

## (undated) DEVICE — DETERGENT RENUZYME PLUS 10 OZ PACKET (50/BX)

## (undated) DEVICE — RESERVOIR SUCTION 100 CC - SILICONE (20EA/CA)

## (undated) DEVICE — MIDAS LUBRICATOR DIFFUSER PACK (4EA/CA)

## (undated) DEVICE — GLOVE BIOGEL PI ORTHO SZ 7.5 PF LF (40PR/BX)

## (undated) DEVICE — MASK ANESTHESIA ADULT  - (100/CA)

## (undated) DEVICE — GOWN SURGEONS X-LARGE - DISP. (30/CA)

## (undated) DEVICE — DRESSING NON-ADHERING 8 X 3 - (50/BX)

## (undated) DEVICE — DRAPE IOBAN II INCISE 23X17 - (10EA/BX 4BX/CA)

## (undated) DEVICE — KIT ANESTHESIA W/CIRCUIT & 3/LT BAG W/FILTER (20EA/CA)

## (undated) DEVICE — DRAPE MAYO STAND - (30/CA)

## (undated) DEVICE — SUTURE GENERAL

## (undated) DEVICE — ARMREST CRADLE FOAM - (2PR/PK 12PR/CA)

## (undated) DEVICE — ELECTRODE 850 FOAM ADHESIVE - HYDROGEL RADIOTRNSPRNT (50/PK)

## (undated) DEVICE — KIT ROOM DECONTAMINATION

## (undated) DEVICE — NEEDLE SPINAL NON-SAFETY 18 GA X 3 IN (25EA/BX)

## (undated) DEVICE — NEPTUNE 4 PORT MANIFOLD - (20/PK)

## (undated) DEVICE — SYRINGE SAFETY 10 ML 18 GA X 1 1/2 BLUNT LL (100/BX 4BX/CA)

## (undated) DEVICE — GLOVE BIOGEL PI INDICATOR SZ 7.0 SURGICAL PF LF - (50/BX 4BX/CA)

## (undated) DEVICE — GLOVE BIOGEL PI ORTHO SZ 6 1/2 SURGICAL PF LF (40PR/BX)

## (undated) DEVICE — GOWN WARMING STANDARD FLEX - (30/CA)

## (undated) DEVICE — SENSOR SPO2 NEO LNCS ADHESIVE (20/BX) SEE USER NOTES

## (undated) DEVICE — SUTURE 4-0 MONOCRYL PLUS PS-1 - 27 INCH (36/BX)

## (undated) DEVICE — BAG, SPONGE COUNT 50600

## (undated) DEVICE — TUBING CLEARLINK DUO-VENT - C-FLO (48EA/CA)

## (undated) DEVICE — SUTURE 0 VICRYL PLUS CT-1 - 8 X 18 INCH (12/BX)

## (undated) DEVICE — PACK NEURO - (2EA/CA)

## (undated) DEVICE — LACTATED RINGERS INJ 1000 ML - (14EA/CA 60CA/PF)

## (undated) DEVICE — GLOVE BIOGEL PI INDICATOR SZ 8.0 SURGICAL PF LF -(50/BX 4BX/CA)

## (undated) DEVICE — ELECTRODE DUAL RETURN W/ CORD - (50/PK)

## (undated) DEVICE — PATTIES SURG NEURO X-RAY 1/2X1/2 (10EA/PK 20PK/CS)

## (undated) DEVICE — SYRINGE SAFETY 3 ML 18 GA X 1 1/2 BLUNT LL (100/BX 8BX/CA)

## (undated) DEVICE — KIT SURGIFLO W/OUT THROMBIN - (6EA/CA)

## (undated) DEVICE — GOWN SURGEONS LARGE - (32/CA)

## (undated) DEVICE — SPONGE GAUZESTER 4 X 4 4PLY - (128PK/CA)

## (undated) DEVICE — SET EXTENSION WITH 2 PORTS (48EA/CA) ***PART #2C8610 IS A SUBSTITUTE*****

## (undated) DEVICE — SPHERE NAVIGATION STEALTH (5EA/TY 12TY/PK)

## (undated) DEVICE — SET LEADWIRE 5 LEAD BEDSIDE DISPOSABLE ECG (1SET OF 5/EA)

## (undated) DEVICE — TOOL DISSECT MATCH HEAD

## (undated) DEVICE — TRAY CATHETER FOLEY URINE METER W/STATLOCK 350ML (10EA/CA)

## (undated) DEVICE — DRAPE LAPAROTOMY T SHEET - (12EA/CA)

## (undated) DEVICE — GLOVE SZ 8 BIOGEL PI MICRO - PF LF (50PR/BX)

## (undated) DEVICE — PROTECTOR ULNA NERVE - (36PR/CA)

## (undated) DEVICE — DRAPE LARGE 3 QUARTER - (20/CA)

## (undated) DEVICE — DRAIN JACKSON PRATT 10FR - (10/CS)

## (undated) DEVICE — CHLORAPREP 26 ML APPLICATOR - ORANGE TINT(25/CA)

## (undated) DEVICE — LACTATED RINGERS INJ. 500 ML - (24EA/CA)

## (undated) DEVICE — TUBE E-T HI-LO CUFF 8.0MM (10EA/PK)

## (undated) DEVICE — SUCTION INSTRUMENT YANKAUER BULBOUS TIP W/O VENT (50EA/CA)